# Patient Record
Sex: MALE | Race: WHITE | Employment: OTHER | URBAN - METROPOLITAN AREA
[De-identification: names, ages, dates, MRNs, and addresses within clinical notes are randomized per-mention and may not be internally consistent; named-entity substitution may affect disease eponyms.]

---

## 2017-01-05 DIAGNOSIS — E78.00 PURE HYPERCHOLESTEROLEMIA: ICD-10-CM

## 2017-01-05 RX ORDER — SIMVASTATIN 10 MG/1
TABLET, FILM COATED ORAL
Qty: 90 TAB | Refills: 1 | Status: SHIPPED | OUTPATIENT
Start: 2017-01-05 | End: 2017-07-17 | Stop reason: SDUPTHER

## 2017-02-03 ENCOUNTER — TELEPHONE (OUTPATIENT)
Dept: INTERNAL MEDICINE CLINIC | Age: 82
End: 2017-02-03

## 2017-02-06 NOTE — TELEPHONE ENCOUNTER
Writer spoke with patient and informed him that he received his prevnar 13 in 2015 at [facility], and that he also received pneumovax in 2001 here at the office.  Patient understood

## 2017-05-30 ENCOUNTER — OFFICE VISIT (OUTPATIENT)
Dept: INTERNAL MEDICINE CLINIC | Age: 82
End: 2017-05-30

## 2017-05-30 VITALS
SYSTOLIC BLOOD PRESSURE: 137 MMHG | DIASTOLIC BLOOD PRESSURE: 69 MMHG | OXYGEN SATURATION: 96 % | BODY MASS INDEX: 24.45 KG/M2 | HEART RATE: 72 BPM | RESPIRATION RATE: 18 BRPM | HEIGHT: 67 IN | WEIGHT: 155.8 LBS | TEMPERATURE: 98.3 F

## 2017-05-30 DIAGNOSIS — S40.022A TRAUMATIC HEMATOMA OF LEFT UPPER ARM, INITIAL ENCOUNTER: ICD-10-CM

## 2017-05-30 DIAGNOSIS — S40.812A ABRASION OF LEFT ARM, INITIAL ENCOUNTER: Primary | ICD-10-CM

## 2017-05-30 NOTE — PROGRESS NOTES
HISTORY OF PRESENT ILLNESS  Ashley Giang is a 80 y.o. male. HPI  Patient presents to the office for evaluation of his left arm. He reports last week he went to see his daughter and her family. When he stopped at the hotel, he tripped over the curb and fell into a brick wall. He used his left forearm to stop the fall. He reports soon after that he noticed a small like ball under the skin. He called his daughter and she told him he had a hematoma and to apply ice. Once he got to his daughter's home his son- in law looked at the area and used some coban dressing and a topical on the area. He reports it was looking much better until he hit again before leaving on the car trunk. He states it only hurt a little and he continued on his day. He noticed today on the drive home that he was having some swelling again and decided to come in for evaluation. Review of Systems   Musculoskeletal:        Left forearm swelling and  bruising, mild pain     Blood pressure 137/69, pulse 72, temperature 98.3 °F (36.8 °C), temperature source Oral, resp. rate 18, height 5' 7\" (1.702 m), weight 155 lb 12.8 oz (70.7 kg), SpO2 96 %. Physical Exam   Skin:   Left forearm- coban present on the arm. Appears tight  After removing the coban and two Band-Aid small abrasion noted. The area does not appear infected. He does have considerable ecchymosis. Edema is noted distal of both ends or were the coban was located. Very mild tenderness. Does not appear infected. ASSESSMENT and PLAN  Sis was seen today for arm swelling. Diagnoses and all orders for this visit:    Abrasion of left arm, initial encounter    Traumatic hematoma of left upper arm, initial encounter    I cleansed the area with saline after removing the dressing. Advised him the swelling will improve with elevation. I would hold with the coban at this time. Used non- adherent dressing on the abrasion. The bruising will resolve with time.  It may go through some color changes and this is normal. I Dr. Eleni Gaitan was able to see the area as well and agrees. He may follow up if needed.

## 2017-07-17 DIAGNOSIS — E78.00 PURE HYPERCHOLESTEROLEMIA: ICD-10-CM

## 2017-07-17 RX ORDER — SIMVASTATIN 10 MG/1
TABLET, FILM COATED ORAL
Qty: 90 TAB | Refills: 1 | Status: SHIPPED | OUTPATIENT
Start: 2017-07-17 | End: 2018-01-16 | Stop reason: SDUPTHER

## 2017-08-02 ENCOUNTER — TELEPHONE (OUTPATIENT)
Dept: INTERNAL MEDICINE CLINIC | Age: 82
End: 2017-08-02

## 2017-08-02 DIAGNOSIS — E78.00 PURE HYPERCHOLESTEROLEMIA: Primary | ICD-10-CM

## 2017-08-02 DIAGNOSIS — M81.0 OSTEOPOROSIS WITHOUT CURRENT PATHOLOGICAL FRACTURE, UNSPECIFIED OSTEOPOROSIS TYPE: ICD-10-CM

## 2017-08-02 DIAGNOSIS — I65.29 STENOSIS OF CAROTID ARTERY, UNSPECIFIED LATERALITY: ICD-10-CM

## 2017-08-02 NOTE — TELEPHONE ENCOUNTER
Called patient to [facility] CPE apt for Friday 9/15. The only CPE apts left for September are afternoon apts. The patient was wondering if he could  lab work before his new apt (9/14 @ 1:15) because he can't wait that long to eat in the day.  Please call him back at [phone] to let him know if he is able to do this

## 2017-08-03 DIAGNOSIS — F32.A ANXIETY AND DEPRESSION: ICD-10-CM

## 2017-08-03 DIAGNOSIS — F41.9 ANXIETY AND DEPRESSION: ICD-10-CM

## 2017-08-03 RX ORDER — ESCITALOPRAM OXALATE 10 MG/1
10 TABLET ORAL DAILY
Qty: 90 TAB | Refills: 0 | Status: SHIPPED | OUTPATIENT
Start: 2017-08-03 | End: 2017-10-13 | Stop reason: SDUPTHER

## 2017-08-03 NOTE — TELEPHONE ENCOUNTER
Notified the patient that his lab slip is available for  at the . He will complete the labs a few days prior to his appt.

## 2017-09-07 ENCOUNTER — TELEPHONE (OUTPATIENT)
Dept: INTERNAL MEDICINE CLINIC | Age: 82
End: 2017-09-07

## 2017-09-11 ENCOUNTER — HOSPITAL ENCOUNTER (OUTPATIENT)
Dept: LAB | Age: 82
Discharge: HOME | End: 2017-09-11
Payer: MEDICARE

## 2017-09-11 PROCEDURE — 80061 LIPID PANEL: CPT

## 2017-09-11 PROCEDURE — 82306 VITAMIN D 25 HYDROXY: CPT

## 2017-09-11 PROCEDURE — 36415 COLL VENOUS BLD VENIPUNCTURE: CPT

## 2017-09-11 PROCEDURE — 80053 COMPREHEN METABOLIC PANEL: CPT

## 2017-09-11 PROCEDURE — 85025 COMPLETE CBC W/AUTO DIFF WBC: CPT

## 2017-09-12 LAB
25(OH)D3+25(OH)D2 SERPL-MCNC: 46.6 NG/ML (ref 30–100)
ALBUMIN SERPL-MCNC: 4.2 G/DL (ref 3.5–4.7)
ALBUMIN/GLOB SERPL: 1.8 {RATIO} (ref 1.2–2.2)
ALP SERPL-CCNC: 49 IU/L (ref 39–117)
ALT SERPL-CCNC: 18 IU/L (ref 0–44)
AST SERPL-CCNC: 23 IU/L (ref 0–40)
BASOPHILS # BLD AUTO: 0 X10E3/UL (ref 0–0.2)
BASOPHILS NFR BLD AUTO: 1 %
BILIRUB SERPL-MCNC: 0.6 MG/DL (ref 0–1.2)
BUN SERPL-MCNC: 18 MG/DL (ref 8–27)
BUN/CREAT SERPL: 24 (ref 10–24)
CALCIUM SERPL-MCNC: 9.2 MG/DL (ref 8.6–10.2)
CHLORIDE SERPL-SCNC: 102 MMOL/L (ref 96–106)
CHOLEST SERPL-MCNC: 140 MG/DL (ref 100–199)
CO2 SERPL-SCNC: 24 MMOL/L (ref 18–29)
CREAT SERPL-MCNC: 0.75 MG/DL (ref 0.76–1.27)
EOSINOPHIL # BLD AUTO: 0 X10E3/UL (ref 0–0.4)
EOSINOPHIL NFR BLD AUTO: 1 %
ERYTHROCYTE [DISTWIDTH] IN BLOOD BY AUTOMATED COUNT: 14.7 % (ref 12.3–15.4)
GLOBULIN SER CALC-MCNC: 2.4 G/DL (ref 1.5–4.5)
GLUCOSE SERPL-MCNC: 101 MG/DL (ref 65–99)
HCT VFR BLD AUTO: 36.8 % (ref 37.5–51)
HDLC SERPL-MCNC: 57 MG/DL
HGB BLD-MCNC: 12.4 G/DL (ref 12.6–17.7)
IMM GRANULOCYTES # BLD: 0 X10E3/UL (ref 0–0.1)
IMM GRANULOCYTES NFR BLD: 0 %
INTERPRETATION, 910389: NORMAL
LDLC SERPL CALC-MCNC: 72 MG/DL (ref 0–99)
LYMPHOCYTES # BLD AUTO: 1.4 X10E3/UL (ref 0.7–3.1)
LYMPHOCYTES NFR BLD AUTO: 36 %
MCH RBC QN AUTO: 33.5 PG (ref 26.6–33)
MCHC RBC AUTO-ENTMCNC: 33.7 G/DL (ref 31.5–35.7)
MCV RBC AUTO: 100 FL (ref 79–97)
MONOCYTES # BLD AUTO: 0.6 X10E3/UL (ref 0.1–0.9)
MONOCYTES NFR BLD AUTO: 14 %
NEUTROPHILS # BLD AUTO: 1.9 X10E3/UL (ref 1.4–7)
NEUTROPHILS NFR BLD AUTO: 48 %
PLATELET # BLD AUTO: 209 X10E3/UL (ref 150–379)
POTASSIUM SERPL-SCNC: 4.6 MMOL/L (ref 3.5–5.2)
PROT SERPL-MCNC: 6.6 G/DL (ref 6–8.5)
RBC # BLD AUTO: 3.7 X10E6/UL (ref 4.14–5.8)
SODIUM SERPL-SCNC: 141 MMOL/L (ref 134–144)
TRIGL SERPL-MCNC: 55 MG/DL (ref 0–149)
VLDLC SERPL CALC-MCNC: 11 MG/DL (ref 5–40)
WBC # BLD AUTO: 3.9 X10E3/UL (ref 3.4–10.8)

## 2017-09-14 ENCOUNTER — OFFICE VISIT (OUTPATIENT)
Dept: INTERNAL MEDICINE CLINIC | Age: 82
End: 2017-09-14

## 2017-09-14 VITALS
OXYGEN SATURATION: 98 % | WEIGHT: 156 LBS | HEIGHT: 67 IN | RESPIRATION RATE: 16 BRPM | HEART RATE: 68 BPM | BODY MASS INDEX: 24.48 KG/M2 | SYSTOLIC BLOOD PRESSURE: 112 MMHG | DIASTOLIC BLOOD PRESSURE: 66 MMHG

## 2017-09-14 DIAGNOSIS — M81.0 OSTEOPOROSIS WITHOUT CURRENT PATHOLOGICAL FRACTURE, UNSPECIFIED OSTEOPOROSIS TYPE: ICD-10-CM

## 2017-09-14 DIAGNOSIS — D75.89 MACROCYTOSIS: ICD-10-CM

## 2017-09-14 DIAGNOSIS — K66.8 MESENTERIC CYST: ICD-10-CM

## 2017-09-14 DIAGNOSIS — Z79.899 HIGH RISK MEDICATION USE: ICD-10-CM

## 2017-09-14 DIAGNOSIS — D64.9 ANEMIA, UNSPECIFIED TYPE: ICD-10-CM

## 2017-09-14 DIAGNOSIS — Z00.00 MEDICARE ANNUAL WELLNESS VISIT, SUBSEQUENT: Primary | ICD-10-CM

## 2017-09-14 RX ORDER — FLUORIDE (SODIUM) 0.02 %
SOLUTION, NON-ORAL DENTAL
COMMUNITY
Start: 2017-08-20

## 2017-09-14 RX ORDER — CICLOPIROX 80 MG/ML
SOLUTION TOPICAL
COMMUNITY
Start: 2017-07-16

## 2017-09-14 NOTE — PATIENT INSTRUCTIONS

## 2017-09-14 NOTE — PROGRESS NOTES
Coordination of Care Questions    1. Have you been to the ER, urgent care clinic since your last visit? No       Hospitalized since your last visit? No    2. Have you seen or consulted any other health care providers outside of the [address] [place] [address] since your last visit? Include any pap smears or colon screening.  Yes, [employer]

## 2017-09-14 NOTE — PROGRESS NOTES
Chief Complaint   Patient presents with    Annual Wellness Visit     discuss labs      numbess  bilat hands  abd sxwelling    Patient Active Problem List    Diagnosis    PVC (premature ventricular contraction)     10/14 holter, rare pvc, pac, no sig elis or tachy  10/14 echo calcified non stenotic av, otherwise normal      Dizzy    Mesenteric cyst    SUSAN on CPAP    Insomnia    Allergic rhinitis, cause unspecified    Pure hypercholesterolemia    Carotid stenosis     Mild disease  2012 repeat 2014 no change        Osteoporosis     Nestor Guido  On meds for 10 years        Drug Holiday      Neg 2.7  Lumbar osteoporosis      Colon polyp    Diverticulosis    OCD (obsessive compulsive disorder)       Subjective:   He feels generally well, some underlying slight anxiety. Bowel function has been good. His fingertips get numb, mostly when he wakes up in the morning, it is mostly the 4th and 5th. Doesn't seem to bother him during the day. He's had no trauma to his elbows, no trauma to his hands. ROM of his neck sometimes creaks and probably has arthritis. Bowel function has been good. Shortness of breath or chest pain are denied. Followed with a number of specialities. Mood has been pretty stable with present meds. Appetite is good. He continues to do all of his own ADLs and continues to be very active. 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 He reads a lot and is quite interested in lots of topics. Physical Examination:  BP was normal.  Lungs were clear. S1, S2 regular. He has an abdominal mass that is an old mesenteric cyst.  He thinks it's enlarging. Plan:  1. His lab work shows a macrocytic anemia. The anemia is a little lower than before and he does have macrocytosis. Last year B12 levels were normal.  Lab work otherwise is pretty unrevealing.   So my recommendations would be in a couple of months repeat his CBC, chemistry, retic count and other studies to see if this is a worsening issue. 2. He will have a CT of the abdomen because of the abdominal mass, which previously was a mesenteric cyst, to look and see if it's changing in size. 3. His medications will be renewed, no changes will be made effective today. We discussed this in detail. 4. In reference to the hand tingling, nothing on exam and he has good strength. I suspect it is either carpal tunnel, atypical, or some ulnar neuropathy, intermittent, and will monitor. Offered him wrist splints at night. He was reluctant to try those. This is a Subsequent Medicare Annual Wellness Exam (AWV) (Performed 12 months after IPPE or effective date of Medicare Part B enrollment, Once in a lifetime)    I have reviewed the patient's medical history in detail and updated the computerized patient record. History     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    Muscle pain     OCD (obsessive compulsive disorder)     SUSAN on CPAP 8/8/2013    Osteoporosis     Urinary frequency       Past Surgical History:   Procedure Laterality Date    ENDOSCOPY, COLON, DIAGNOSTIC      12/09; [facility]    HX COLONOSCOPY  2008    HX ORTHOPAEDIC      at 6years old broke arm    HX TONSIL AND ADENOIDECTOMY      about 9years old     Current Outpatient Prescriptions   Medication Sig Dispense Refill    ciclopirox (PENLAC) 8 % solution       PREVIDENT 0.2 % soln       escitalopram oxalate (LEXAPRO) 10 mg tablet Take 1 Tab by mouth daily. 90 Tab 0    simvastatin (ZOCOR) 10 mg tablet TAKE ONE TABLET BY MOUTH EVERY EVENING 90 Tab 1    latanoprost (XALATAN) 0.005 % ophthalmic solution Administer 1 Drop to left eye nightly.  fluticasone (FLONASE) 50 mcg/actuation nasal spray 2 Sprays by Both Nostrils route daily.  (Patient taking differently: 2 Sprays by Both Nostrils route daily as needed.) 1 Bottle 5    alendronate (FOSAMAX) 70 mg tablet Take  by mouth.  aspirin delayed-release 81 mg tablet Take 81 mg by mouth daily.  COCONUT OIL PO Take  by mouth.  MULTIVITAMIN PO Take  by mouth.  ergocalciferol (VITAMIN D) 50,000 unit capsule Take 50,000 Units by mouth. Vitamin D every 10 days.  omega-3 fatty acids-vitamin e (FISH OIL) 1,000 mg Cap Take 1 Cap by mouth two (2) times a day.  cpap machine kit by Does Not Apply route.  CALCIUM CARBONATE/VITAMIN D3 (CALCIUM + D PO) Take  by mouth. Allergies   Allergen Reactions    Sulfa (Sulfonamide Antibiotics) Unknown (comments)     Family History   Problem Relation Age of Onset    Heart Disease Father      heart attack age 63    Psychiatric Disorder Mother     Diabetes Daughter      Social History   Substance Use Topics    Smoking status: Former Smoker     Quit date: 3/26/1972    Smokeless tobacco: Never Used    Alcohol use Yes      Comment: <1     Patient Active Problem List   Diagnosis Code    Osteoporosis M81.0    Colon polyp K63.5    Diverticulosis K57.90    OCD (obsessive compulsive disorder) F42.9    Allergic rhinitis, cause unspecified J30.9    Pure hypercholesterolemia E78.00    Carotid stenosis I65.29    Insomnia G47.00    SUSAN on CPAP G47.33, Z99.89    Mesenteric cyst K66.8    PVC (premature ventricular contraction) I49.3    Dizzy R42    Macrocytosis D75.89       Depression Risk Factor Screening:     PHQ over the last two weeks 9/14/2017   PHQ Not Done -   Little interest or pleasure in doing things Not at all   Feeling down, depressed or hopeless Not at all   Total Score PHQ 2 0     Alcohol Risk Factor Screening: You do not drink alcohol or very rarely. Functional Ability and Level of Safety:   Hearing LossHearing is good.     Activities of Daily Living  The home contains: no safety equipment  Patient does total self care    Fall Risk  Fall Risk Assessment, last 12 mths 9/14/2017   Able to walk? Yes   Fall in past 12 months? No   Fall with injury? -   Number of falls in past 12 months -   Fall Risk Score -       Abuse Screen  Patient is not abused    Cognitive Screening   Evaluation of Cognitive Function:  Has your family/caregiver stated any concerns about your memory: no  Normal    Patient Care Team   Patient Care Team:  Maritza Dean MD as PCP - General (Internal Medicine)  Fransisco Pablo MD (Cardiology)    Assessment/Plan   Education and counseling provided:  Are appropriate based on today's review and evaluation  End-of-Life planning (with patient's consent)  Pneumococcal Vaccine        Health Maintenance Due   Topic Date Due    DTaP/Tdap/Td series (1 - Tdap) 03/07/1953    COLONOSCOPY  12/01/2014    GLAUCOMA SCREENING Q2Y  12/01/2015    INFLUENZA AGE 9 TO ADULT  08/01/2017    MEDICARE YEARLY EXAM  09/09/2017     Advance Care Planning (ACP) Provider Conversation Snapshot    Date of ACP Conversation: 09/14/17  Persons included in Conversation:  patient  Length of ACP Conversation in minutes:  <16 minutes (Non-Billable)    Authorized Decision Maker (if patient is incapable of making informed decisions): This person is:   patient          For Patients with Decision Making Capacity:   Values/Goals: Exploration of values, goals, and preferences if recovery is not expected, even with continued medical treatment in the event of:  Imminent death    Conversation Outcomes / Follow-Up Plan:   Recommended communicating the plan and making copies for the healthcare agent, personal physician, and others as appropriate (e.g., health system)  Recommended review of completed ACP document annually or upon change in health status      1.  Medicare annual wellness visit, subsequent    - ciclopirox (PENLAC) 8 % solution;   - PREVIDENT 0.2 % soln;   - CBC WITH AUTOMATED DIFF  - PROTEIN ELECTROPHORESIS  - HAPTOGLOBIN  - IRON PROFILE  - RETICULOCYTE COUNT    2. Macrocytosis  Reviewed plan  - ciclopirox (PENLAC) 8 % solution;   - PREVIDENT 0.2 % soln;   - CBC WITH AUTOMATED DIFF  - PROTEIN ELECTROPHORESIS  - HAPTOGLOBIN  - IRON PROFILE  - RETICULOCYTE COUNT    3. Anemia, unspecified type    - CBC WITH AUTOMATED DIFF  - PROTEIN ELECTROPHORESIS  - HAPTOGLOBIN  - IRON PROFILE  - RETICULOCYTE COUNT    4. Mesenteric cyst  larger  - CT ABD PELV WO CONT; Future    5. Osteoporosis without current pathological fracture, unspecified osteoporosis type  Seeing endo on alendronate note reviewed    6.  High risk medication use  reviewed

## 2017-09-18 DIAGNOSIS — K66.8 MESENTERIC CYST: Primary | ICD-10-CM

## 2017-10-13 ENCOUNTER — PATIENT MESSAGE (OUTPATIENT)
Dept: INTERNAL MEDICINE CLINIC | Age: 82
End: 2017-10-13

## 2017-10-13 DIAGNOSIS — F41.9 ANXIETY AND DEPRESSION: ICD-10-CM

## 2017-10-13 DIAGNOSIS — F32.A ANXIETY AND DEPRESSION: ICD-10-CM

## 2017-10-13 RX ORDER — ESCITALOPRAM OXALATE 10 MG/1
10 TABLET ORAL DAILY
Qty: 90 TAB | Refills: 1 | Status: SHIPPED | OUTPATIENT
Start: 2017-10-13 | End: 2018-04-22 | Stop reason: SDUPTHER

## 2017-10-13 NOTE — TELEPHONE ENCOUNTER
From: Merline Cruel  To: Diego Montague MD  Sent: 10/13/2017 12:15 PM EDT  Subject: Prescription Question    Dr. Poncho Wright,    I would appreciate your sending to [facility] a new prescription for Escitalopram Tabs 10mg with refills. I am curently out of refills. Thanks.     Sis

## 2017-10-20 ENCOUNTER — HOSPITAL ENCOUNTER (OUTPATIENT)
Dept: ULTRASOUND IMAGING | Age: 82
Discharge: HOME | End: 2017-10-20
Attending: INTERNAL MEDICINE
Payer: MEDICARE

## 2017-10-20 DIAGNOSIS — K66.8 MESENTERIC CYST: ICD-10-CM

## 2017-10-20 PROCEDURE — 76700 US EXAM ABDOM COMPLETE: CPT

## 2017-12-05 ENCOUNTER — OFFICE VISIT (OUTPATIENT)
Dept: INTERNAL MEDICINE CLINIC | Age: 82
End: 2017-12-05

## 2017-12-05 VITALS
DIASTOLIC BLOOD PRESSURE: 51 MMHG | HEIGHT: 67 IN | HEART RATE: 73 BPM | SYSTOLIC BLOOD PRESSURE: 115 MMHG | WEIGHT: 158 LBS | TEMPERATURE: 98.2 F | RESPIRATION RATE: 16 BRPM | OXYGEN SATURATION: 94 % | BODY MASS INDEX: 24.8 KG/M2

## 2017-12-05 DIAGNOSIS — M72.2 PLANTAR FASCIITIS: ICD-10-CM

## 2017-12-05 DIAGNOSIS — R31.0 GROSS HEMATURIA: Primary | ICD-10-CM

## 2017-12-05 DIAGNOSIS — R31.29 OTHER MICROSCOPIC HEMATURIA: ICD-10-CM

## 2017-12-05 LAB
BILIRUB UR QL STRIP: NEGATIVE
GLUCOSE UR-MCNC: NEGATIVE MG/DL
KETONES P FAST UR STRIP-MCNC: NORMAL MG/DL
PH UR STRIP: 6 [PH] (ref 4.6–8)
PROT UR QL STRIP: NORMAL
SP GR UR STRIP: 1.02 (ref 1–1.03)
UA UROBILINOGEN AMB POC: NORMAL (ref 0.2–1)
URINALYSIS CLARITY POC: CLEAR
URINALYSIS COLOR POC: YELLOW
URINE BLOOD POC: NEGATIVE
URINE LEUKOCYTES POC: NEGATIVE
URINE NITRITES POC: NEGATIVE

## 2017-12-05 RX ORDER — MULTIVITAMIN
CAPSULE ORAL
COMMUNITY
Start: 2016-11-07 | End: 2016-11-07

## 2017-12-05 NOTE — PROGRESS NOTES
All health maintenance and other pertinent information has been reviewed in preparation for today's office visit. Patient presents in the office today for:    Chief Complaint   Patient presents with    Blood in Urine     Pt c/o small amount of blood in urine which occured 10/1/17. Has not occured since; pt denies any other symptoms of complications. 1. Have you been to the ER, urgent care clinic since your last visit? Hospitalized since your last visit? No    2. Have you seen or consulted any other health care providers outside of the [address] [place] [address] since your last visit? Include any pap smears or colon screening.  No

## 2017-12-05 NOTE — MR AVS SNAPSHOT
Visit Information Date & Time Provider Department Dept. Phone Encounter #  
 12/5/2017  8:30 AM Pillo Ortiz MD Replaced by [facility] [phone] [id number] Upcoming Health Maintenance Date Due DTaP/Tdap/Td series (1 - Tdap) 3/7/1953 COLONOSCOPY 12/1/2014 GLAUCOMA SCREENING Q2Y 12/1/2015 Influenza Age 5 to Adult 8/1/2017 MEDICARE YEARLY EXAM 9/15/2018 Allergies as of 12/5/2017  Review Complete On: 12/5/2017 By: Gabbi Lew LPN Severity Noted Reaction Type Reactions Sulfa (Sulfonamide Antibiotics)  09/12/2011    Unknown (comments) Current Immunizations  Reviewed on 2/6/2017 Name Date Influenza High Dose Vaccine PF 10/1/2016 Influenza Vaccine 9/30/2013 Pneumococcal Conjugate (PCV-13) 10/6/2015 Pneumococcal Vaccine (Unspecified Type) 7/8/2001 Zoster Vaccine, Live 1/2/2009 Not reviewed this visit You Were Diagnosed With   
  
 Codes Comments Gross hematuria    -  Primary ICD-10-CM: R31.0 ICD-9-CM: 599.71 Other microscopic hematuria     ICD-10-CM: R31.29 ICD-9-CM: 599.72 Vitals BP Pulse Temp Resp Height(growth percentile) Weight(growth percentile) 115/51 (BP 1 Location: Left arm, BP Patient Position: Sitting) 73 98.2 °F (36.8 °C) (Oral) 16 5' 7\" (1.702 m) 158 lb (71.7 kg) SpO2 BMI Smoking Status 94% 24.75 kg/m2 Former Smoker Vitals History BMI and BSA Data Body Mass Index Body Surface Area 24.75 kg/m 2 1.84 m 2 Preferred Pharmacy Pharmacy Name Phone [phone] [address] [phone] Your Updated Medication List  
  
   
This list is accurate as of: 12/5/17  9:08 AM.  Always use your most recent med list.  
  
  
  
  
 aspirin delayed-release 81 mg tablet Take 81 mg by mouth daily. CALCIUM + D PO Take  by mouth.  
  
 ciclopirox 8 % solution Commonly known as:  PENLAC  
  
 COCONUT OIL PO  
 Take  by mouth. cpap machine kit  
by Does Not Apply route. escitalopram oxalate 10 mg tablet Commonly known as:  Annelise Moots Take 1 Tab by mouth daily. FISH OIL 1,000 mg Cap Generic drug:  omega-3 fatty acids-vitamin e Take 1 Cap by mouth two (2) times a day. fluticasone 50 mcg/actuation nasal spray Commonly known as:  Leah Bliss 2 Sprays by Both Nostrils route daily. FOSAMAX 70 mg tablet Generic drug:  alendronate Take  by mouth.  
  
 latanoprost 0.005 % ophthalmic solution Commonly known as:  [place] Administer 1 Drop to left eye nightly. MULTIVITAMIN PO Take  by mouth. PREVIDENT 0.2 % Soln Generic drug:  sodium fluoride (dental rinse)  
  
 simvastatin 10 mg tablet Commonly known as:  ZOCOR  
TAKE ONE TABLET BY MOUTH EVERY EVENING  
  
 VITAMIN D2 50,000 unit capsule Generic drug:  ergocalciferol Take 50,000 Units by mouth. Vitamin D every 10 days. We Performed the Following AMB POC URINALYSIS DIP STICK AUTO W/O MICRO [93072 CPT(R)] REFERRAL TO UROLOGY [JXF880 Custom] Referral Information Referral ID Referred By Referred To  
  
 [id number] Vicky Arizmendi MD   
   [address] Phone: [phone] Fax: [phone] Visits Status Start Date End Date 1 New Request 12/5/17 12/5/18 If your referral has a status of pending review or denied, additional information will be sent to support the outcome of this decision. Patient Instructions Plantar Fasciitis: Exercises Your Care Instructions Here are some examples of typical rehabilitation exercises for your condition. Start each exercise slowly. Ease off the exercise if you start to have pain. Your doctor or physical therapist will tell you when you can start these exercises and which ones will work best for you. How to do the exercises Towel stretch 1. Sit with your legs extended and knees straight. 2. Place a towel around your foot just under the toes. 3. Hold each end of the towel in each hand, with your hands above your knees. 4. Pull back with the towel so that your foot stretches toward you. 5. Hold the position for at least 15 to 30 seconds. 6. Repeat 2 to 4 times a session, up to 5 sessions a day. Calf stretch This exercise stretches the muscles at the back of the lower leg (the calf) and the Achilles tendon. Do this exercise 3 or 4 times a day, 5 days a week. 1. Stand facing a wall with your hands on the wall at about eye level. Put the leg you want to stretch about a step behind your other leg. 2. Keeping your back heel on the floor, bend your front knee until you feel a stretch in the back leg. 3. Hold the stretch for 15 to 30 seconds. Repeat 2 to 4 times. Plantar fascia and calf stretch Stretching the plantar fascia and calf muscles can increase flexibility and decrease heel pain. You can do this exercise several times each day and before and after activity. 1. Stand on a step as shown above. Be sure to hold on to the banister. 2. Slowly let your heels down over the edge of the step as you relax your calf muscles. You should feel a gentle stretch across the bottom of your foot and up the back of your leg to your knee. 3. Hold the stretch about 15 to 30 seconds, and then tighten your calf muscle a little to bring your heel back up to the level of the step. Repeat 2 to 4 times. Towel curls Make this exercise more challenging by placing a weighted object, such as a soup can, on the other end of the towel. 1. While sitting, place your foot on a towel on the floor and scrunch the towel toward you with your toes. 2. Then, also using your toes, push the towel away from you. [place] pickups 1. Put marbles on the floor next to a cup. 
2. Using your toes, try to lift the marbles up from the floor and put them in the cup. Follow-up care is a key part of your treatment and safety. Be sure to make and go to all appointments, and call your doctor if you are having problems. It's also a good idea to know your test results and keep a list of the medicines you take. Where can you learn more? Go to http://sherwin-caprice.info/. Emilee Or in the search box to learn more about \"Plantar Fasciitis: Exercises. \" Current as of: March 21, 2017 Content Version: 11.4 © 2311-8725 V[employer]maker. Care instructions adapted under license by [employer] (which disclaims liability or warranty for this information). If you have questions about a medical condition or this instruction, always ask your healthcare professional. [address] any warranty or liability for your use of this information. Introducing [facility]! Dear Mikki Rubio: Thank you for requesting a [employer] Genius account. Our records indicate that you already have an active [employer] Genius account. You can access your account anytime at [email or web address] Did you know that you can access your hospital and ER discharge instructions at any time in DIY Genius? You can also review all of your test results from your hospital stay or ER visit. Additional Information If you have questions, please visit the Frequently Asked Questions section of the DIY Genius website at [email or web address] Remember, DIY Genius is NOT to be used for urgent needs. For medical emergencies, dial 911. Now available from your iPhone and Android! Please provide this summary of care documentation to your next provider. Your primary care clinician is listed as Tabitha Fu. If you have any questions after today's visit, please call [phone].

## 2017-12-05 NOTE — PATIENT INSTRUCTIONS
Plantar Fasciitis: Exercises  Your Care Instructions  Here are some examples of typical rehabilitation exercises for your condition. Start each exercise slowly. Ease off the exercise if you start to have pain. Your doctor or physical therapist will tell you when you can start these exercises and which ones will work best for you. How to do the exercises  Towel stretch    1. Sit with your legs extended and knees straight. 2. Place a towel around your foot just under the toes. 3. Hold each end of the towel in each hand, with your hands above your knees. 4. Pull back with the towel so that your foot stretches toward you. 5. Hold the position for at least 15 to 30 seconds. 6. Repeat 2 to 4 times a session, up to 5 sessions a day. Calf stretch    This exercise stretches the muscles at the back of the lower leg (the calf) and the Achilles tendon. Do this exercise 3 or 4 times a day, 5 days a week. 1. Stand facing a wall with your hands on the wall at about eye level. Put the leg you want to stretch about a step behind your other leg. 2. Keeping your back heel on the floor, bend your front knee until you feel a stretch in the back leg. 3. Hold the stretch for 15 to 30 seconds. Repeat 2 to 4 times. Plantar fascia and calf stretch    Stretching the plantar fascia and calf muscles can increase flexibility and decrease heel pain. You can do this exercise several times each day and before and after activity. 1. Stand on a step as shown above. Be sure to hold on to the banister. 2. Slowly let your heels down over the edge of the step as you relax your calf muscles. You should feel a gentle stretch across the bottom of your foot and up the back of your leg to your knee. 3. Hold the stretch about 15 to 30 seconds, and then tighten your calf muscle a little to bring your heel back up to the level of the step. Repeat 2 to 4 times.   Towel curls    Make this exercise more challenging by placing a weighted object, such as a soup can, on the other end of the towel. 1. While sitting, place your foot on a towel on the floor and scrunch the towel toward you with your toes. 2. Then, also using your toes, push the towel away from you. [place] pickups    1. Put marbles on the floor next to a cup.  2. Using your toes, try to lift the marbles up from the floor and put them in the cup. Follow-up care is a key part of your treatment and safety. Be sure to make and go to all appointments, and call your doctor if you are having problems. It's also a good idea to know your test results and keep a list of the medicines you take. Where can you learn more? Go to http://sherwin-caprice.info/. Griselda Riggins in the search box to learn more about \"Plantar Fasciitis: Exercises. \"  Current as of: March 21, 2017  Content Version: 11.4  © 0434-4176 Healthwise, [employer]orporated. Care instructions adapted under license by [employer] (which disclaims liability or warranty for this information). If you have questions about a medical condition or this instruction, always ask your healthcare professional. [address] [address] any warranty or liability for your use of this information.

## 2017-12-05 NOTE — PROGRESS NOTES
Chief Complaint   Patient presents with    Blood in Urine     Pt c/o small amount of blood in urine which occured 10/1/17. Has not occured since; pt denies any other symptoms of complications. Frequency slow stream are normal  For him the urine has cleared it was painless bleeding  Had US in October both kidneys appeared normal  Prostate exam in sept un revealing  US Results (most recent):    [facility] from [facility] [place] [place] on 10[facility]20/17   US ABD COMP   Narrative EXAM:  US ABD COMP     INDICATION: Follow-up mesenteric cysts. COMPARISON: 9/25/2015. TECHNIQUE:   Real-time sonography of the abdomen was performed with multiple static images of  the liver, gallbladder, pancreas, spleen, kidneys and retroperitoneum obtained. FINDINGS:  LIVER:   The liver is normal in echotexture with 2 stable hemangiomata, the larger of  which measures 2.9 cm. LIVER VASCULATURE:   The portal vein flow is hepatopedal.    GALLBLADDER:  Sludge is noted in the gallbladder. No gallbladder wall thickening or  sonographic Thrasher's sign. COMMON BILE DUCT:  There is no biliary duct dilatation and the common duct measures 3.4 mm in  diameter. PANCREAS:  The pancreas is not well-visualized due to bowel gas. SPLEEN:  The spleen is normal in echotexture and size and measures 10.4 cm in length. RIGHT KIDNEY:  The right kidney demonstrates normal echogenicity with no mass, stone or  hydronephrosis. The right kidney measures 11.1 cm in length. LEFT KIDNEY:  The left kidney demonstrates normal echogenicity with no mass, stone or  hydronephrosis. The left kidney measures 11.1 cm in length. RETROPERITONEUM:  The aorta is not well-visualized due to bowel gas  The IVC is normal.  No retroperitoneal mass is identified. The left lower quadrant cyst is unchanged in size, measuring 14.6 x 10.3 x 14.9  cm. Impression IMPRESSION: Large cystic lesion in the left lower quadrant is stable.  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 Sludge is noted in the gallbladder. Vitals:    12/05/17 0830   BP: 115/51   Pulse: 73   Resp: 16   Temp: 98.2 °F (36.8 °C)   TempSrc: Oral   SpO2: 94%   Weight: 158 lb (71.7 kg)   Height: 5' 7\" (1.702 m)     no apparent distress    The abdomen is soft without tenderness, guarding, mass, rebound or organomegaly. Bowel sounds are normal. No CVA tenderness or inguinal adenopathy noted. has fullness from cyst    Results for orders placed or performed in visit on 12/05/17   AMB POC URINALYSIS DIP STICK AUTO W/O MICRO   Result Value Ref Range    Color (UA POC) Yellow     Clarity (UA POC) Clear     Glucose (UA POC) Negative Negative    Bilirubin (UA POC) Negative Negative    Ketones (UA POC) Trace Negative    Specific gravity (UA POC) 1.020 1.001 - 1.035    Blood (UA POC) Negative Negative    pH (UA POC) 6.0 4.6 - 8.0    Protein (UA POC) 1+ Negative    Urobilinogen (UA POC) 0.2 mg/dL 0.2 - 1    Nitrites (UA POC) Negative Negative    Leukocyte esterase (UA POC) Negative Negative     He wishes to also address some pain in the heel on left at today's visit. These have been mild-to-moderate in nature, gradual in onset. Exam shows mild generalized muscle tenderness. These pains seem benign and are likely related to fasciitis. OTC or prescription NSAID's are recommended for PRN use, side effects are discussed. Return for further discussion if these persist or worsen.           1. Other microscopic hematuria  clear  - AMB POC URINALYSIS DIP STICK AUTO W/O MICRO    2. Gross hematuria  Needs work up reviewed in detail stop aspirin now no nsaids  [facility]    3. Plantar fasciitis  stretchinfg

## 2018-01-16 DIAGNOSIS — E78.00 PURE HYPERCHOLESTEROLEMIA: ICD-10-CM

## 2018-01-16 RX ORDER — SIMVASTATIN 10 MG/1
TABLET, FILM COATED ORAL
Qty: 90 TAB | Refills: 1 | Status: SHIPPED | OUTPATIENT
Start: 2018-01-16 | End: 2018-08-16 | Stop reason: SDUPTHER

## 2018-01-16 NOTE — TELEPHONE ENCOUNTER
Patient would like Rx for \"symbastatin\" with refills sent to [facility].  Please call patient back at [phone] if there are questions.              From answering service

## 2018-03-03 ENCOUNTER — APPOINTMENT (OUTPATIENT)
Dept: GENERAL RADIOLOGY | Age: 83
End: 2018-03-03
Attending: EMERGENCY MEDICINE
Payer: MEDICARE

## 2018-03-03 ENCOUNTER — HOSPITAL ENCOUNTER (EMERGENCY)
Age: 83
Discharge: HOME | End: 2018-03-03
Attending: EMERGENCY MEDICINE
Payer: MEDICARE

## 2018-03-03 VITALS
HEART RATE: 95 BPM | TEMPERATURE: 98.6 F | OXYGEN SATURATION: 100 % | RESPIRATION RATE: 16 BRPM | DIASTOLIC BLOOD PRESSURE: 68 MMHG | BODY MASS INDEX: 24.8 KG/M2 | WEIGHT: 158 LBS | HEIGHT: 67 IN | SYSTOLIC BLOOD PRESSURE: 110 MMHG

## 2018-03-03 DIAGNOSIS — R33.9 URINARY RETENTION: Primary | ICD-10-CM

## 2018-03-03 LAB
ALBUMIN SERPL-MCNC: 3.6 G/DL (ref 3.5–5)
ALBUMIN/GLOB SERPL: 1 {RATIO} (ref 1.1–2.2)
ALP SERPL-CCNC: 62 U/L (ref 45–117)
ALT SERPL-CCNC: 20 U/L (ref 12–78)
ANION GAP SERPL CALC-SCNC: 8 MMOL/L (ref 5–15)
APPEARANCE UR: CLEAR
AST SERPL-CCNC: 22 U/L (ref 15–37)
BACTERIA URNS QL MICRO: ABNORMAL /HPF
BASOPHILS # BLD: 0 K/UL (ref 0–0.1)
BASOPHILS NFR BLD: 0 % (ref 0–1)
BILIRUB SERPL-MCNC: 0.8 MG/DL (ref 0.2–1)
BILIRUB UR QL: NEGATIVE
BUN SERPL-MCNC: 16 MG/DL (ref 6–20)
BUN/CREAT SERPL: 22 (ref 12–20)
CALCIUM SERPL-MCNC: 8.4 MG/DL (ref 8.5–10.1)
CHLORIDE SERPL-SCNC: 106 MMOL/L (ref 97–108)
CO2 SERPL-SCNC: 24 MMOL/L (ref 21–32)
COLOR UR: ABNORMAL
CREAT SERPL-MCNC: 0.73 MG/DL (ref 0.7–1.3)
DIFFERENTIAL METHOD BLD: ABNORMAL
EOSINOPHIL # BLD: 0 K/UL (ref 0–0.4)
EOSINOPHIL NFR BLD: 0 % (ref 0–7)
EPITH CASTS URNS QL MICRO: ABNORMAL /LPF
ERYTHROCYTE [DISTWIDTH] IN BLOOD BY AUTOMATED COUNT: 14.3 % (ref 11.5–14.5)
GLOBULIN SER CALC-MCNC: 3.6 G/DL (ref 2–4)
GLUCOSE SERPL-MCNC: 113 MG/DL (ref 65–100)
GLUCOSE UR STRIP.AUTO-MCNC: 100 MG/DL
HCT VFR BLD AUTO: 36 % (ref 36.6–50.3)
HGB BLD-MCNC: 12.2 G/DL (ref 12.1–17)
HGB UR QL STRIP: ABNORMAL
HYALINE CASTS URNS QL MICRO: ABNORMAL /LPF (ref 0–5)
IMM GRANULOCYTES # BLD: 0.1 K/UL (ref 0–0.04)
IMM GRANULOCYTES NFR BLD AUTO: 1 % (ref 0–0.5)
KETONES UR QL STRIP.AUTO: NEGATIVE MG/DL
LEUKOCYTE ESTERASE UR QL STRIP.AUTO: ABNORMAL
LYMPHOCYTES # BLD: 1.8 K/UL (ref 0.8–3.5)
LYMPHOCYTES NFR BLD: 13 % (ref 12–49)
MCH RBC QN AUTO: 34.6 PG (ref 26–34)
MCHC RBC AUTO-ENTMCNC: 33.9 G/DL (ref 30–36.5)
MCV RBC AUTO: 102 FL (ref 80–99)
MONOCYTES # BLD: 1.6 K/UL (ref 0–1)
MONOCYTES NFR BLD: 12 % (ref 5–13)
NEUTS SEG # BLD: 10.5 K/UL (ref 1.8–8)
NEUTS SEG NFR BLD: 75 % (ref 32–75)
NITRITE UR QL STRIP.AUTO: NEGATIVE
NRBC # BLD: 0 K/UL (ref 0–0.01)
NRBC BLD-RTO: 0 PER 100 WBC
PH UR STRIP: 6.5 [PH] (ref 5–8)
PLATELET # BLD AUTO: 218 K/UL (ref 150–400)
PMV BLD AUTO: 9.6 FL (ref 8.9–12.9)
POTASSIUM SERPL-SCNC: 4.1 MMOL/L (ref 3.5–5.1)
PROT SERPL-MCNC: 7.2 G/DL (ref 6.4–8.2)
PROT UR STRIP-MCNC: NEGATIVE MG/DL
RBC # BLD AUTO: 3.53 M/UL (ref 4.1–5.7)
RBC #/AREA URNS HPF: ABNORMAL /HPF (ref 0–5)
SODIUM SERPL-SCNC: 138 MMOL/L (ref 136–145)
SP GR UR REFRACTOMETRY: 1.02 (ref 1–1.03)
UR CULT HOLD, URHOLD: NORMAL
UROBILINOGEN UR QL STRIP.AUTO: 0.2 EU/DL (ref 0.2–1)
WBC # BLD AUTO: 14 K/UL (ref 4.1–11.1)
WBC URNS QL MICRO: ABNORMAL /HPF (ref 0–4)

## 2018-03-03 PROCEDURE — 85025 COMPLETE CBC W/AUTO DIFF WBC: CPT | Performed by: EMERGENCY MEDICINE

## 2018-03-03 PROCEDURE — 77030005530 HC CATH URETH FOL40 BARD -B

## 2018-03-03 PROCEDURE — 77030005514 HC CATH URETH FOL14 BARD -A

## 2018-03-03 PROCEDURE — 36415 COLL VENOUS BLD VENIPUNCTURE: CPT | Performed by: EMERGENCY MEDICINE

## 2018-03-03 PROCEDURE — 77030029179 HC BAG URIN DRNG SIMS -A

## 2018-03-03 PROCEDURE — 51798 US URINE CAPACITY MEASURE: CPT

## 2018-03-03 PROCEDURE — 51702 INSERT TEMP BLADDER CATH: CPT

## 2018-03-03 PROCEDURE — 71046 X-RAY EXAM CHEST 2 VIEWS: CPT

## 2018-03-03 PROCEDURE — 81001 URINALYSIS AUTO W/SCOPE: CPT | Performed by: EMERGENCY MEDICINE

## 2018-03-03 PROCEDURE — 99283 EMERGENCY DEPT VISIT LOW MDM: CPT

## 2018-03-03 PROCEDURE — 80053 COMPREHEN METABOLIC PANEL: CPT | Performed by: EMERGENCY MEDICINE

## 2018-03-03 NOTE — ED NOTES
First cath attempt unsuccessful due to meeting resistance upon insertion with 16 F regular wilkinson cath. Second attempt with 18F coude successful with only some resistance. Draining dark yellow, clear urine.

## 2018-03-03 NOTE — DISCHARGE INSTRUCTIONS

## 2018-03-03 NOTE — ED PROVIDER NOTES
HPI Comments: 80 y.o. male with past medical history significant for osteoporosis, colon polyp, OCD, hypercholesterolemia, urinary frequency, hemorrhoids, anxiety, diverticulosis, insomnia, SUSAN on CPAP, and mesenteric cyst who presents to the ED with chief complaint of urinary pain. Patient reports undergoing a \"laser surgery to remove calcium deposits in (his) bladder\" ~12 days ago at [facility]. Patient reports being seen by [facility] ~10 days ago for urinary pain and lower back pain; reports having a wilkinson catheter placed at that time. Patient reports his wilkinson catheter was removed ~2 days ago; reports he was able to urinate with minimal pain at that time. Patient reports urgency and dysuria with onset \"last night,\" reports needing to get up \"six times last night\" and being able to urinate \"just enough to make the pain better. \" Patient reports currently being on Cefuroxime. Patient denies any back pain, fever, nausea, vomiting, and hematuria in the last ~2 days. There are no other acute medical concerns at this time. PCP: Rj Montelongo MD    Note written by Kiana Patton, as dictated by Ruthy Rudolph MD 3:25 PM   The history is provided by the patient. 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    Muscle pain     OCD (obsessive compulsive disorder)     SUSAN on CPAP 8/8/2013    Osteoporosis     Urinary frequency        Past Surgical History:   Procedure Laterality Date    ENDOSCOPY, COLON, DIAGNOSTIC      12/09;  [facility]    HX COLONOSCOPY  2008    HX ORTHOPAEDIC      at 6years old broke arm    HX TONSIL AND ADENOIDECTOMY      about 9years old         Family History:   Problem Relation Age of Onset    Heart Disease Father      heart attack age 61    Psychiatric Disorder Mother     Diabetes Daughter        Social History     Social History    Marital status:      Spouse name: N/A    Number of children: N/A    Years of education: N/A     Occupational History    Not on file. Social History Main Topics    Smoking status: Former Smoker     Quit date: 3/26/1972    Smokeless tobacco: Never Used    Alcohol use Yes      Comment: <1    Drug use: No    Sexual activity: Yes     Partners: Female     Other Topics Concern    Not on file     Social History Narrative         ALLERGIES: Sulfa (sulfonamide antibiotics)    Review of Systems   Constitutional: Negative for activity change, chills and fever. HENT: Negative for nosebleeds, sore throat, trouble swallowing and voice change. Eyes: Negative for visual disturbance. Respiratory: Negative for shortness of breath. Cardiovascular: Negative for chest pain and palpitations. Gastrointestinal: Negative for abdominal pain, constipation, diarrhea, nausea and vomiting. Genitourinary: Positive for difficulty urinating, dysuria and urgency. Negative for hematuria. Musculoskeletal: Negative for back pain, neck pain and neck stiffness. Skin: Negative for color change. Allergic/Immunologic: Negative for immunocompromised state. Neurological: Negative for dizziness, seizures, syncope, weakness, light-headedness, numbness and headaches. Psychiatric/Behavioral: Negative for behavioral problems, confusion, hallucinations, self-injury and suicidal ideas. All other systems reviewed and are negative. Vitals:    03/03/18 1511   BP: 108/69   Pulse: (!) 102   Resp: 18   Temp: 98.6 °F (37 °C)   SpO2: 96%   Weight: 71.7 kg (158 lb)   Height: 5' 7\" (1.702 m)            Physical Exam   Constitutional: He is oriented to person, place, and time. He appears well-developed and well-nourished. No distress. Little cachectic. HENT:   Head: Normocephalic and atraumatic. Eyes: Pupils are equal, round, and reactive to light. Neck: Normal range of motion.  Neck supple. Cardiovascular: Normal rate, regular rhythm and normal heart sounds. Exam reveals no gallop and no friction rub. No murmur heard. Pulmonary/Chest: Effort normal and breath sounds normal. No respiratory distress. He has no wheezes. Abdominal: Soft. Bowel sounds are normal. He exhibits no distension. There is no tenderness. There is no rebound and no guarding. No abdominal tenderness. Musculoskeletal: Normal range of motion. Neurological: He is alert and oriented to person, place, and time. Skin: Skin is warm. No rash noted. He is not diaphoretic. Psychiatric: He has a normal mood and affect. His behavior is normal. Judgment and thought content normal.   Nursing note and vitals reviewed. Note written by Kiana Hays, as dictated by Juliana Nunez MD 3:32 PM      [facility]      ED Course     This is an 51-year-old male with past medical history, review of systems, physical exam as above, presenting with complaints of cough urinating, and the setting of recent laser therapy, and indwelling Fuller catheter, catheter removed several days ago. Patient states urgency, frequency, with little flow. He states he is currently taking cefuroxime. He states the discomfort is resolved after urinating, denies hematuria, fevers, chills, nausea, vomiting, back pain. Physical exam remarkable for elderly male, in no acute distress, with a soft nontender abdomen, clear breath sounds. Will obtain CBC, CMP, UA, bladder scan. Unable to be sure and, [address] Fuller catheter, and make a disposition based on the patient's diagnostics and response to therapy. Procedures    6:28 PM  Fuller placed with 1L output, patient symptoms relieved. Elevated WBC without other signs of infections, CXR wnl. Will send urine for cx, and advise patient to follow with Urology Monday.

## 2018-03-03 NOTE — ED TRIAGE NOTES
Pt presents with complaints of difficulty urinating after catheter removed on Thursday. Pt states last night he woke up frequently having to urinate with minimal output.

## 2018-03-27 ENCOUNTER — OFFICE VISIT (OUTPATIENT)
Dept: INTERNAL MEDICINE CLINIC | Age: 83
End: 2018-03-27

## 2018-03-27 VITALS
HEIGHT: 67 IN | RESPIRATION RATE: 16 BRPM | WEIGHT: 154.4 LBS | DIASTOLIC BLOOD PRESSURE: 60 MMHG | BODY MASS INDEX: 24.23 KG/M2 | TEMPERATURE: 97.9 F | SYSTOLIC BLOOD PRESSURE: 127 MMHG | HEART RATE: 73 BPM | OXYGEN SATURATION: 94 %

## 2018-03-27 DIAGNOSIS — M25.562 ARTHRALGIA OF LEFT KNEE: ICD-10-CM

## 2018-03-27 RX ORDER — SILODOSIN 8 MG/1
CAPSULE ORAL
COMMUNITY
Start: 2018-03-22

## 2018-03-27 NOTE — PROGRESS NOTES
1. Have you been to the ER, urgent care clinic since your last visit? Hospitalized since your last visit? No    2. Have you seen or consulted any other health care providers outside of the [address] [place] [address] since your last visit? Include any pap smears or colon screening.  No   Chief Complaint   Patient presents with    Other     blood in stool    Shoulder Pain     right shoulder for 1 week    Knee Pain     left knee for the last 2 days     Not fasting

## 2018-03-27 NOTE — MR AVS SNAPSHOT
[address] [place] [address] [address] [place] [address] 
[phone] Patient: Hallie Whitlock MRN: [id number] BOD:5[birth year] Visit Information Date & Time Provider Department Dept. Phone Encounter #  
 3/27/2018  8:30 AM Celeste Coronado MD [facility] [phone] [id number] Upcoming Health Maintenance Date Due DTaP/Tdap/Td series (1 - Tdap) 3/7/1953 COLONOSCOPY 12/1/2014 GLAUCOMA SCREENING Q2Y 12/1/2015 Influenza Age 5 to Adult 8/1/2017 MEDICARE YEARLY EXAM 9/15/2018 Allergies as of 3/27/2018  Review Complete On: 3/27/2018 By: Celeste Coronado MD  
  
 Severity Noted Reaction Type Reactions Sulfa (Sulfonamide Antibiotics)  09/12/2011    Unknown (comments) Current Immunizations  Reviewed on 2/6/2017 Name Date Influenza High Dose Vaccine PF 10/1/2016 Influenza Vaccine 9/30/2013 Pneumococcal Conjugate (PCV-13) 10/6/2015 Pneumococcal Vaccine (Unspecified Type) 7/8/2001 Zoster Vaccine, Live 1/2/2009 Not reviewed this visit You Were Diagnosed With   
  
 Codes Comments Rotator cuff syndrome of right shoulder and allied disorders    -  Primary ICD-10-CM: M75.101 ICD-9-CM: 726.10 Arthralgia of left knee     ICD-10-CM: E54.258 ICD-9-CM: 719.46 Vitals BP Pulse Temp Resp Height(growth percentile) Weight(growth percentile) 127/60 (BP 1 Location: Left arm, BP Patient Position: At rest) 73 97.9 °F (36.6 °C) (Oral) 16 5' 7\" (1.702 m) 154 lb 6.4 oz (70 kg) SpO2 BMI Smoking Status 94% 24.18 kg/m2 Former Smoker BMI and BSA Data Body Mass Index Body Surface Area  
 24.18 kg/m 2 1.82 m 2 [phone] Your Updated Medication List  
  
   
This list is accurate as of 3/27/18  9:12 AM.  Always use your most recent med list.  
  
  
 aspirin delayed-release 81 mg tablet Take 81 mg by mouth daily. CALCIUM + D PO Take  by mouth.  
  
 ciclopirox 8 % solution Commonly known as:  PENLAC  
  
 COCONUT OIL PO Take  by mouth. cpap machine kit  
by Does Not Apply route. escitalopram oxalate 10 mg tablet Commonly known as:  Madalyn Mend Take 1 Tab by mouth daily. FISH OIL 1,000 mg Cap Generic drug:  omega-3 fatty acids-vitamin e Take 1 Cap by mouth two (2) times a day. fluticasone 50 mcg/actuation nasal spray Commonly known as:  Memory Lust 2 Sprays by Both Nostrils route daily. FOSAMAX 70 mg tablet Generic drug:  alendronate Take  by mouth.  
  
 latanoprost 0.005 % ophthalmic solution Commonly known as:  Sam Velazquez Administer 1 Drop to left eye nightly. MULTIVITAMIN PO Take  by mouth. PREVIDENT 0.2 % Soln Generic drug:  sodium fluoride (dental rinse) RAPAFLO 8 mg capsule Generic drug:  silodosin  
  
 simvastatin 10 mg tablet Commonly known as:  ZOCOR  
TAKE ONE TABLET BY MOUTH EVERY EVENING  
  
 VITAMIN D2 50,000 unit capsule Generic drug:  ergocalciferol Take 50,000 Units by mouth. Vitamin D every 10 days. We Performed the Following REFERRAL TO PHYSICAL THERAPY [CNA92 Custom] Referral Information Referral ID Referred By Referred To  
  
 [id number] [facility] [address] [place], [address] [place] [address] Pkwy Phone: [phone] Fax: [phone] Visits Status Start Date End Date 1 New Request 3/27/18 3/27/19 If your referral has a status of pending review or denied, additional information will be sent to support the outcome of this decision. Introducing [facility]! Dear Teofilo Decker: Thank you for requesting a [employer] account. Our records indicate that you already have an active [employer] account.   You can access your account anytime at [email or web address] Did you know that you can access your hospital and ER discharge instructions at any time in [employer]? You can also review all of your test results from your hospital stay or ER visit. Additional Information If you have questions, please visit the Frequently Asked Questions section of the [employer] website at [email or web address] Remember, [employer] is NOT to be used for urgent needs. For medical emergencies, dial 911. Now available from your iPhone and Android! Please provide this summary of care documentation to your next provider. Your primary care clinician is listed as Cayden Hand. If you have any questions after today's visit, please call [phone].

## 2018-03-27 NOTE — PROGRESS NOTES
Chief Complaint   Patient presents with    Other     blood in stool    Shoulder Pain     right shoulder for 1 week    Knee Pain     left knee for the last 2 days     Urological issues for weeks, bladder stones   Retention, abd pain turp catheter and now doing well    2 weeks ago brpr, had a narcotic and azo    Left knee pain with walking and right shoulder pain are new issues both recent and recurrent      Vitals:    03/27/18 0842   BP: 127/60   Pulse: 73   Resp: 16   Temp: 97.9 °F (36.6 °C)   TempSrc: Oral   SpO2: 94%   Weight: 154 lb 6.4 oz (70 kg)   Height: 5' 7\" (1.702 m)     A right shoulder exam was performed. GENERAL: no acute distress  SWELLING: none  DEFORMITY: none  TENDERNESS: moderate  ROM: external rotation at 90 degrees abduction painful and reduced  STRENGTH: limited by pain  Knee exam: the injured knee reveals normal exam, no swelling, tenderness, instability; ligaments intact, FROM. X-ray is negative for fracture. 1. Rotator cuff syndrome of right shoulder and allied disorders  No surgery now was suggested in past  - REFERRAL TO PHYSICAL THERAPY    2.  Arthralgia of left knee  Quad strengthening would help  - REFERRAL TO PHYSICAL THERAPY

## 2018-04-14 ENCOUNTER — HOSPITAL ENCOUNTER (EMERGENCY)
Age: 83
Discharge: HOME | End: 2018-04-14
Attending: EMERGENCY MEDICINE
Payer: MEDICARE

## 2018-04-14 VITALS
HEIGHT: 67 IN | SYSTOLIC BLOOD PRESSURE: 113 MMHG | DIASTOLIC BLOOD PRESSURE: 75 MMHG | WEIGHT: 147 LBS | BODY MASS INDEX: 23.07 KG/M2 | HEART RATE: 80 BPM | TEMPERATURE: 98.1 F | OXYGEN SATURATION: 95 % | RESPIRATION RATE: 18 BRPM

## 2018-04-14 DIAGNOSIS — R33.9 URINARY RETENTION: Primary | ICD-10-CM

## 2018-04-14 DIAGNOSIS — N39.0 URINARY TRACT INFECTION WITHOUT HEMATURIA, SITE UNSPECIFIED: ICD-10-CM

## 2018-04-14 LAB
APPEARANCE UR: ABNORMAL
BACTERIA URNS QL MICRO: NEGATIVE /HPF
BILIRUB UR QL: NEGATIVE
COLOR UR: ABNORMAL
EPITH CASTS URNS QL MICRO: ABNORMAL /LPF
GLUCOSE UR STRIP.AUTO-MCNC: NEGATIVE MG/DL
HGB UR QL STRIP: ABNORMAL
HYALINE CASTS URNS QL MICRO: ABNORMAL /LPF (ref 0–5)
KETONES UR QL STRIP.AUTO: ABNORMAL MG/DL
LEUKOCYTE ESTERASE UR QL STRIP.AUTO: ABNORMAL
NITRITE UR QL STRIP.AUTO: NEGATIVE
PH UR STRIP: 7 [PH] (ref 5–8)
PROT UR STRIP-MCNC: 100 MG/DL
RBC #/AREA URNS HPF: ABNORMAL /HPF (ref 0–5)
SP GR UR REFRACTOMETRY: 1.02 (ref 1–1.03)
UROBILINOGEN UR QL STRIP.AUTO: 0.2 EU/DL (ref 0.2–1)
WBC URNS QL MICRO: >100 /HPF (ref 0–4)

## 2018-04-14 PROCEDURE — 51702 INSERT TEMP BLADDER CATH: CPT

## 2018-04-14 PROCEDURE — 99282 EMERGENCY DEPT VISIT SF MDM: CPT

## 2018-04-14 PROCEDURE — 87186 SC STD MICRODIL/AGAR DIL: CPT | Performed by: PHYSICIAN ASSISTANT

## 2018-04-14 PROCEDURE — 74011000250 HC RX REV CODE- 250: Performed by: PHYSICIAN ASSISTANT

## 2018-04-14 PROCEDURE — 81001 URINALYSIS AUTO W/SCOPE: CPT | Performed by: EMERGENCY MEDICINE

## 2018-04-14 PROCEDURE — 77030005518 HC CATH URETH FOL 2W BARD -B

## 2018-04-14 PROCEDURE — 87086 URINE CULTURE/COLONY COUNT: CPT | Performed by: PHYSICIAN ASSISTANT

## 2018-04-14 PROCEDURE — 77030034850

## 2018-04-14 PROCEDURE — 51798 US URINE CAPACITY MEASURE: CPT

## 2018-04-14 PROCEDURE — 87077 CULTURE AEROBIC IDENTIFY: CPT | Performed by: PHYSICIAN ASSISTANT

## 2018-04-14 RX ORDER — LIDOCAINE HYDROCHLORIDE 20 MG/ML
JELLY TOPICAL
Status: COMPLETED | OUTPATIENT
Start: 2018-04-14 | End: 2018-04-14

## 2018-04-14 RX ADMIN — LIDOCAINE HYDROCHLORIDE: 20 JELLY TOPICAL at 15:24

## 2018-04-14 NOTE — Clinical Note
Leave the catheter in place for the next several days and follow up with own urologist this coming week for recheck of the catheter. Continue your current medications as directed.

## 2018-04-14 NOTE — ED NOTES
2:32 PM  I have evaluated the patient as the Provider in Triage. I have reviewed His vital signs and the triage nurse assessment. I have talked with the patient and any available family and advised that I am the provider in triage and have ordered the appropriate study to initiate their work up based on the clinical presentation during my assessment. I have advised that the patient will be accommodated in the Main ED as soon as possible. I have also requested to contact the triage nurse or myself immediately if the patient experiences any changes in their condition during this brief waiting period. Hx of urinary retention. Currently being treated for UTI. Here for urinary retention.        Mary Carrera PA-C

## 2018-04-14 NOTE — ED PROVIDER NOTES
HPI Comments: 80 y.o. male with past medical history significant for osteoporosis, colon polyp, diverticulosis, and urinary frequency who presents ambulatory from home with chief complaint of urinary rentention. Patient reports onset of being unable to urinate at 04:00 this morning that gradually became worse. Patient also reports onset of urinary frequency a few days ago and was evaluated at the [facility] yesterday. Patient had a UTI and started taking Cefdinir twice a day. Of note, patient has extensive genitourinary history in the last 6 months, including procedures for his bladder and prostate and several urinary catheterizations. Pertinent negatives include fever and shortness of breath. There are no other acute medical concerns at this time. Social hx: Previous tobacco use (quit date: 03/26/72); alcohol use; denies illicit drug use  PCP: Lebron Callas, MD    Note written by Kiana Vazquez, as dictated by Sarah Leija MD 3:20 PM      The history is provided by the patient. No  was used. 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    Muscle pain     OCD (obsessive compulsive disorder)     SUSAN on CPAP 8/8/2013    Osteoporosis     Urinary frequency        Past Surgical History:   Procedure Laterality Date    ENDOSCOPY, COLON, DIAGNOSTIC      12/09;  [facility]    HX COLONOSCOPY  2008    HX ORTHOPAEDIC      at 6years old broke arm    HX TONSIL AND ADENOIDECTOMY      about 9years old         Family History:   Problem Relation Age of Onset    Heart Disease Father      heart attack age 61    Psychiatric Disorder Mother     Diabetes Daughter        Social History     Social History    Marital status:      Spouse name: N/A    Number of children: N/A    Years of education: N/A     Occupational History    Not on file. Social History Main Topics    Smoking status: Former Smoker     Quit date: 3/26/1972    Smokeless tobacco: Never Used    Alcohol use Yes      Comment: <1    Drug use: No    Sexual activity: Yes     Partners: Female     Other Topics Concern    Not on file     Social History Narrative         ALLERGIES: Sulfa (sulfonamide antibiotics)    Review of Systems   Constitutional: Negative for activity change, appetite change, fatigue and fever. HENT: Negative for ear pain, facial swelling, sore throat and trouble swallowing. Eyes: Negative for pain, discharge and visual disturbance. Respiratory: Negative for chest tightness, shortness of breath and wheezing. Cardiovascular: Negative for chest pain and palpitations. Gastrointestinal: Negative for abdominal pain, blood in stool, nausea and vomiting. Genitourinary: Positive for decreased urine volume and difficulty urinating. Negative for flank pain and hematuria. Frequency: resolved. Musculoskeletal: Negative for arthralgias, joint swelling, myalgias and neck pain. Skin: Negative for color change and rash. Neurological: Negative for dizziness, weakness, numbness and headaches. Hematological: Negative for adenopathy. Does not bruise/bleed easily. Psychiatric/Behavioral: Negative for behavioral problems, confusion and sleep disturbance. All other systems reviewed and are negative. Vitals:    04/14/18 1427   BP: 150/75   Pulse: 88   Resp: 18   Temp: 98.2 °F (36.8 °C)   SpO2: 97%   Weight: 66.7 kg (147 lb)   Height: 5' 7\" (1.702 m)            Physical Exam   Constitutional: He is oriented to person, place, and time. He appears well-developed and well-nourished. He appears distressed. HENT:   Head: Normocephalic and atraumatic. Nose: Nose normal.   Mouth/Throat: Oropharynx is clear and moist.   Eyes: Conjunctivae and EOM are normal. Pupils are equal, round, and reactive to light. No scleral icterus.    Neck: Normal range of motion. Neck supple. No JVD present. No tracheal deviation present. No thyromegaly present. No carotid bruits noted. Cardiovascular: Normal rate, regular rhythm, normal heart sounds and intact distal pulses. Exam reveals no gallop and no friction rub. No murmur heard. Pulmonary/Chest: Effort normal and breath sounds normal. No respiratory distress. He has no wheezes. He has no rales. He exhibits no tenderness. Abdominal: Soft. Bowel sounds are normal. He exhibits no distension and no mass. There is no tenderness. There is no rebound and no guarding. Had abdominal cyst for a long time that does not give him any problems  There is fullness and pain over the symphysis consistent with distended bladder. Genitourinary:   Genitourinary Comments: Bladder is enlarged and tender; can feel bladder above pubic symphysis   Musculoskeletal: Normal range of motion. He exhibits no edema or tenderness. Lymphadenopathy:     He has no cervical adenopathy. Neurological: He is alert and oriented to person, place, and time. He has normal reflexes. No cranial nerve deficit. Coordination normal.   Skin: Skin is warm and dry. No rash noted. No erythema. Psychiatric: He has a normal mood and affect. His behavior is normal. Judgment and thought content normal.   Nursing note and vitals reviewed.    Note written by Kiana Brand, as dictated by Giovani Foley MD 3:20 PM      MDM  Number of Diagnoses or Management Options  Urinary retention: established and worsening  Urinary tract infection without hematuria, site unspecified: established and worsening     Amount and/or Complexity of Data Reviewed  Clinical lab tests: ordered and reviewed  Decide to obtain previous medical records or to obtain history from someone other than the patient: yes  Review and summarize past medical records: yes  Discuss the patient with other providers: yes    Risk of Complications, Morbidity, and/or Mortality  Presenting problems: moderate  Diagnostic procedures: moderate  Management options: moderate    Patient Progress  Patient progress: stable        ED Course       Procedures  Patient was cathed and placed on a leg bag.

## 2018-04-16 LAB
BACTERIA SPEC CULT: ABNORMAL
CC UR VC: ABNORMAL
SERVICE CMNT-IMP: ABNORMAL

## 2018-04-16 NOTE — CALL BACK NOTE
[facility] Emergency Department Follow Up Call Record    Discharged to : Home/Family Home/Home Health/Skilled Facility/Rehab/Assisted Living/Other_Home______  1) Did you receive your discharge instructions? Yes        2) Do you understand them? Yes         3) Are you able to follow them? Yes          If NO, what can I clarify for you? 4) Do you understand your diagnosis? Yes         5) Do you know which symptoms should prompt you to call the doctor? Yes     6) Were you able to fill and  any medications that were prescribed? Not applicable     7) You were prescribed _n/a_________for ____________________. Common side effects of this medication are____________________. This is not a complete list so please review the forms given from the pharmacy for a complete list.      8) Are there any questions about your medications? No           Have you scheduled any recommended doctors appointments (specialty, PCP) YES  If NO, what barriers are you encountering (transportation/lost contact info/cost/  didnt think necessary/no PCP  9) If discharged with Home Health, has the agency contacted you to schedule visit? Not applicable  10) Is there anyone available to help you at home (meals, errands, transportation    monitoring) (adult children, neighbors, private duty companions) Yes    11) Are you on a special diet? No         If YES, do you understand the requirements for this diet? Education provided? 12) If presented with cough, bronchitis, COPD, asthma, is it ok to ask that the   respiratory disease management educator call you? Not applicable      13)  A) If presented with fall, were you issued an assistive device in the ED    Are you using? Yes  Leg bag device attached to wilkinson catheter  B) If given RX for device, have you obtained? Yes, applied in ED      If NO, barriers? C) Therapist recommended:   Are you able to implement the suggestions? Yes        If NO, barriers to implementation?      D) Are you having any difficulties with mobility inside your home?     (steps, bed, tub)No   If YES, ask if the [facility] PT can contact patient and good time and number?  14)  At the end of your discharge instructions, there is information about accessing [facility] SERVICES, have you had a chance to review those? Yes         Do you have any questions about signing up for this service? NO   We encourage our patients to be active participants in their healthcare and this site is one of the ways to do that. It will allow you to access parts of your medical record, email your doctors office, schedule appointments, and request medications refills . 15) Are there any other questions that I can answer for you regarding    your Emergency department visit?  NO             Estimated Call Time:____2:56 PM  _______________ Date/Time:_______________

## 2018-04-22 DIAGNOSIS — F32.A ANXIETY AND DEPRESSION: ICD-10-CM

## 2018-04-22 DIAGNOSIS — F41.9 ANXIETY AND DEPRESSION: ICD-10-CM

## 2018-04-22 RX ORDER — ESCITALOPRAM OXALATE 10 MG/1
TABLET ORAL
Qty: 90 TAB | Refills: 1 | Status: SHIPPED | OUTPATIENT
Start: 2018-04-22 | End: 2018-11-05 | Stop reason: SDUPTHER

## 2018-06-20 ENCOUNTER — OFFICE VISIT (OUTPATIENT)
Dept: INTERNAL MEDICINE CLINIC | Age: 83
End: 2018-06-20

## 2018-06-20 VITALS
WEIGHT: 151 LBS | HEART RATE: 78 BPM | RESPIRATION RATE: 14 BRPM | DIASTOLIC BLOOD PRESSURE: 67 MMHG | SYSTOLIC BLOOD PRESSURE: 129 MMHG | TEMPERATURE: 97.4 F | HEIGHT: 66 IN | BODY MASS INDEX: 24.27 KG/M2 | OXYGEN SATURATION: 94 %

## 2018-06-20 DIAGNOSIS — R25.2 MUSCLE CRAMPS AT NIGHT: Primary | ICD-10-CM

## 2018-06-20 NOTE — PATIENT INSTRUCTIONS
Preventing Falls: Care Instructions  Your Care Instructions    Getting around your home safely can be a challenge if you have injuries or health problems that make it easy for you to fall. Loose rugs and furniture in walkways are among the dangers for many older people who have problems walking or who have poor eyesight. People who have conditions such as arthritis, osteoporosis, or dementia also have to be careful not to fall. You can make your home safer with a few simple measures. Follow-up care is a key part of your treatment and safety. Be sure to make and go to all appointments, and call your doctor if you are having problems. It's also a good idea to know your test results and keep a list of the medicines you take. How can you care for yourself at home? Taking care of yourself  · You may get dizzy if you do not drink enough water. To prevent dehydration, drink plenty of fluids, enough so that your urine is light yellow or clear like water. Choose water and other caffeine-free clear liquids. If you have kidney, heart, or liver disease and have to limit fluids, talk with your doctor before you increase the amount of fluids you drink. · Exercise regularly to improve your strength, muscle tone, and balance. Walk if you can. Swimming may be a good choice if you cannot walk easily. · Have your vision and hearing checked each year or any time you notice a change. If you have trouble seeing and hearing, you might not be able to avoid objects and could lose your balance. · Know the side effects of the medicines you take. Ask your doctor or pharmacist whether the medicines you take can affect your balance. Sleeping pills or sedatives can affect your balance. · Limit the amount of alcohol you drink. Alcohol can impair your balance and other senses. · Ask your doctor whether calluses or corns on your feet need to be removed.  If you wear loose-fitting shoes because of calluses or corns, you can lose your balance and fall. · Talk to your doctor if you have numbness in your feet. Preventing falls at home  · Remove raised doorway thresholds, throw rugs, and clutter. Repair loose carpet or raised areas in the floor. · Move furniture and electrical cords to keep them out of walking paths. · Use nonskid floor wax, and wipe up spills right away, especially on ceramic tile floors. · If you use a walker or cane, put rubber tips on it. If you use crutches, clean the bottoms of them regularly with an abrasive pad, such as steel wool. · Keep your house well lit, especially [place], and outside walkways. Use night-lights in areas such as hallways and bathrooms. Add extra light switches or use remote switches (such as switches that go on or off when you clap your hands) to make it easier to turn lights on if you have to get up during the night. · Install sturdy handrails on stairways. · Move items in your cabinets so that the things you use a lot are on the lower shelves (about waist level). · Keep a cordless phone and a flashlight with new batteries by your bed. If possible, put a phone in each of the main rooms of your house, or carry a cell phone in case you fall and cannot reach a phone. Or, you can wear a device around your neck or wrist. You push a button that sends a signal for help. · Wear low-heeled shoes that fit well and give your feet good support. Use footwear with nonskid soles. Check the heels and soles of your shoes for wear. Repair or replace worn heels or soles. · Do not wear socks without shoes on wood floors. · Walk on the grass when the sidewalks are slippery. If you live in an area that gets snow and ice in the winter, sprinkle salt on slippery steps and sidewalks. Preventing falls in the bath  · Install grab bars and nonskid mats inside and outside your shower or tub and near the toilet and sinks. · Use shower chairs and bath benches.   · Use a hand-held shower head that will allow you to sit while showering. · Get into a tub or shower by putting the weaker leg in first. Get out of a tub or shower with your strong side first.  · Repair loose toilet seats and consider installing a raised toilet seat to make getting on and off the toilet easier. · Keep your bathroom door unlocked while you are in the shower. Where can you learn more? Go to http://sherwin-caprice.info/. Enter [id number] [id number] in the search box to learn more about \"Preventing Falls: Care Instructions. \"  Current as of: May 12, 2017  Content Version: 11.4  © 8391-0298 [employer]. Care instructions adapted under license by [employer] (which disclaims liability or warranty for this information). If you have questions about a medical condition or this instruction, always ask your healthcare professional. [place] [address] any warranty or liability for your use of this information. How to Get Up Safely After a Fall: Care Instructions  Your Care Instructions    If you have injuries, health problems, or other reasons that may make it easy for you to fall at home, it is a good idea to learn how to get up safely after a fall. Learning how to get up correctly can help you avoid making an injury worse. Also, knowing what to do if you cannot get up can help you stay safe until help arrives. Follow-up care is a key part of your treatment and safety. Be sure to make and go to all appointments, and call your doctor if you are having problems. It's also a good idea to know your test results and keep a list of the medicines you take. How can you care for yourself after a fall? If you think you can get up  First lie still for a few minutes and think about how you feel. If your body feels okay and you think you can get up safely, follow the rest of the steps below:  1. Look for a chair or other piece of furniture that is close to you.   2. Roll onto your side and rest. Roll by turning your head in the direction you want to roll, move your shoulder and arm, then hip and leg in the same direction. 3. Lie still for a moment to let your blood pressure adjust.  4. Slowly push your upper body up, lift your head, and take a moment to rest.  5. Slowly get up on your hands and knees, and crawl to the chair or other stable piece of furniture. 6. Put your hands on the chair. 7. Move one foot forward, and place it flat on the floor. Your other leg should be bent with the knee on the floor. 8. Rise slowly, turn your body, and sit in the chair. Stay seated for a bit and think about how you feel. Call for help. Even if you feel okay, let someone know what happened to you. You might not know that you have a serious injury. If you cannot get up  1. If you think you are injured after a fall or you cannot get up, try not to panic. 2. Call out for help. 3. If you have a phone within reach or you have an emergency call device, use it to call for help. 4. If you do not have a phone within reach, try to slide yourself toward it. If you cannot get to the phone, try to slide toward a door or window or a place where you think you can be heard. 5. [place] or use an object to make noise so someone might hear you. 6. If you can reach something that you can use for a pillow, place it under your head. Try to stay warm by covering yourself with a blanket or clothing while you wait for help. When should you call for help? Call 911 anytime you think you may need emergency care. For example, call if:  ? · You passed out (lost consciousness). ? · You cannot get up after a fall. ? · You have severe pain. ?Call your doctor now or seek immediate medical care if:  ? · You have new or worse pain. ? · You are dizzy or lightheaded. ? · You hit your head. ? Watch closely for changes in your health, and be sure to contact your doctor if:  ? · You do not get better as expected. Where can you learn more?   Go to http://sherwin-caprice.info/. Enter X851 in the search box to learn more about \"How to Get Up Safely After a Fall: Care Instructions. \"  Current as of: May 12, 2017  Content Version: 11.4  © 6355-1962 [employer]. Care instructions adapted under license by [employer] (which disclaims liability or warranty for this information). If you have questions about a medical condition or this instruction, always ask your healthcare professional. [address] any warranty or liability for your use of this information.

## 2018-06-20 NOTE — PROGRESS NOTES
Chief Complaint   Patient presents with    Tingling     and muscle constrictions in toes that caused toes to bend backwards during sleep     Subjective:  Mr. Ramiro Villanueva is an 80year old retired [employer] . He was on a retreat in [place], returned a couple of days ago, long flight home. He's been having some urinary issues during his trip. He has an appointment with [facility] tomorrow. He had really bad leg cramps the first night he got back home. He thinks it was Sunday night. They've resolved now and he's had none for two days. He walks a mile every morning. He wasn't doing as much walking while he was in [place] since his clothing never showed up till the day before they came home. He is feeling better now. Physical Examination:  His blood pressure was normal.  His feet were warm. He had no edema, no tenderness in the calf. ROM of ankles normal.    Impression:  1. Leg cramps, possibly exacerbated by the long flight, although said he did get up frequently and go to the bathroom. Plan:  1. I told him to use either quinine or tonic water at night. 2. To continue to be active. 3. If his symptoms continue I gave him an order for CMP and magnesium. If symptoms resolve I told him it's not necessary to do those tests.     Vitals:    06/20/18 1613   BP: 129/67   Pulse: 78   Resp: 14   Temp: 97.4 °F (36.3 °C)   TempSrc: Oral   SpO2: 94%   Weight: 151 lb (68.5 kg)   Height: 5' 6\" (1.676 m)

## 2018-06-20 NOTE — PROGRESS NOTES
Chief Complaint   Patient presents with    Tingling     and muscle constrictions in toes that caused toes to bend backwards during sleep     Health Maintenance Due   Topic Date Due    DTaP/Tdap/Td series (1 - Tdap) [birth year]07/1953    COLONOSCOPY  12/01/2014    GLAUCOMA SCREENING Q2Y  12/01/2015     Coordination of Care Questions    1. Have you been to the ER, urgent care clinic since your last visit? No       Hospitalized since your last visit? No    2. Have you seen or consulted any other health care providers outside of the [address] [place] [address] since your last visit? Include any pap smears or colon screening.  [facility], had calcium deposits removed from bladder

## 2018-07-30 LAB — CREATININE, EXTERNAL: 0.64

## 2018-08-16 DIAGNOSIS — E78.00 PURE HYPERCHOLESTEROLEMIA: ICD-10-CM

## 2018-08-16 RX ORDER — SIMVASTATIN 10 MG/1
TABLET, FILM COATED ORAL
Qty: 90 TAB | Refills: 1 | Status: SHIPPED | OUTPATIENT
Start: 2018-08-16 | End: 2019-02-07 | Stop reason: SDUPTHER

## 2018-10-29 ENCOUNTER — OFFICE VISIT (OUTPATIENT)
Dept: INTERNAL MEDICINE CLINIC | Age: 83
End: 2018-10-29

## 2018-10-29 ENCOUNTER — HOSPITAL ENCOUNTER (OUTPATIENT)
Dept: LAB | Age: 83
Discharge: HOME | End: 2018-10-29
Payer: MEDICARE

## 2018-10-29 VITALS
DIASTOLIC BLOOD PRESSURE: 58 MMHG | TEMPERATURE: 96.2 F | WEIGHT: 156.4 LBS | BODY MASS INDEX: 25.13 KG/M2 | SYSTOLIC BLOOD PRESSURE: 128 MMHG | OXYGEN SATURATION: 96 % | HEIGHT: 66 IN | RESPIRATION RATE: 16 BRPM | HEART RATE: 66 BPM

## 2018-10-29 DIAGNOSIS — N13.8 BPH WITH URINARY OBSTRUCTION: ICD-10-CM

## 2018-10-29 DIAGNOSIS — F41.9 ANXIETY AND DEPRESSION: ICD-10-CM

## 2018-10-29 DIAGNOSIS — K66.8 MESENTERIC CYST: ICD-10-CM

## 2018-10-29 DIAGNOSIS — F32.A ANXIETY AND DEPRESSION: ICD-10-CM

## 2018-10-29 DIAGNOSIS — N40.1 BPH WITH URINARY OBSTRUCTION: ICD-10-CM

## 2018-10-29 DIAGNOSIS — Z13.39 SCREENING FOR ALCOHOLISM: ICD-10-CM

## 2018-10-29 DIAGNOSIS — I65.29 STENOSIS OF CAROTID ARTERY, UNSPECIFIED LATERALITY: ICD-10-CM

## 2018-10-29 DIAGNOSIS — Z79.899 HIGH RISK MEDICATION USE: ICD-10-CM

## 2018-10-29 DIAGNOSIS — Z00.00 MEDICARE ANNUAL WELLNESS VISIT, SUBSEQUENT: ICD-10-CM

## 2018-10-29 DIAGNOSIS — E78.00 PURE HYPERCHOLESTEROLEMIA: Primary | ICD-10-CM

## 2018-10-29 PROCEDURE — 36415 COLL VENOUS BLD VENIPUNCTURE: CPT

## 2018-10-29 PROCEDURE — 80053 COMPREHEN METABOLIC PANEL: CPT

## 2018-10-29 PROCEDURE — 80061 LIPID PANEL: CPT

## 2018-10-29 PROCEDURE — 85025 COMPLETE CBC W/AUTO DIFF WBC: CPT

## 2018-10-29 NOTE — PATIENT INSTRUCTIONS
Medicare Wellness Visit, Male    The best way to live healthy is to have a lifestyle where you eat a well-balanced diet, exercise regularly, limit alcohol use, and quit all forms of tobacco/nicotine, if applicable. Regular preventive services are another way to keep healthy. Preventive services (vaccines, screening tests, monitoring & exams) can help personalize your care plan, which helps you manage your own care. Screening tests can find health problems at the earliest stages, when they are easiest to treat. [facility] follows the current, evidence-based guidelines published by the [facility] ([facility]) when recommending preventive services for our patients. Because we follow these guidelines, sometimes recommendations change over time as research supports it. (For example, a prostate screening blood test is no longer routinely recommended for men with no symptoms.)  Of course, you and your doctor may decide to screen more often for some diseases, based on your risk and co-morbidities (chronic disease you are already diagnosed with). Preventive services for you include:  - Medicare offers their members a free annual wellness visit, which is time for you and your primary care provider to discuss and plan for your preventive service needs. Take advantage of this benefit every year!  -All adults over age 72 should receive the recommended pneumonia vaccines. Current USPSTF guidelines recommend a series of two vaccines for the best pneumonia protection.   -All adults should have a flu vaccine yearly and an ECG.  All adults age [address] and older should receive a shingles vaccine once in their lifetime.    -All adults age 38-68 who are overweight should have a diabetes screening test once every three years.   -Other screening tests & preventive services for persons with diabetes include: an eye exam to screen for diabetic retinopathy, a kidney function test, a foot exam, and stricter control over your cholesterol.   -Cardiovascular screening for adults with routine risk involves an electrocardiogram (ECG) at intervals determined by the provider.   -Colorectal cancer screening should be done for adults age 54-65 with no increased risk factors for colorectal cancer. There are a number of acceptable methods of screening for this type of cancer. Each test has its own benefits and drawbacks. Discuss with your provider what is most appropriate for you during your annual wellness visit. The different tests include: colonoscopy (considered the best screening method), a fecal occult blood test, a fecal DNA test, and sigmoidoscopy.  -All adults born between [facility] should be screened once for Hepatitis C.  -An Abdominal Aortic Aneurysm (AAA) Screening is recommended for men age 73-68 who has ever smoked in their lifetime.      Here is a list of your current Health Maintenance items (your personalized list of preventive services) with a due date:  Health Maintenance Due   Topic Date Due    DTaP/Tdap/Td  (1 - Tdap) 03[birth year]07/1953    Shingles Vaccine (1 of 2) 03/07/1982    Colonoscopy  12/01/2014    Glaucoma Screening   12/01/2015    Flu Vaccine  08/01/2018    Annual Well Visit  09/15/2018

## 2018-10-29 NOTE — PROGRESS NOTES
1. Have you been to the ER, urgent care clinic since your last visit? Hospitalized since your last visit? No 
 
2. Have you seen or consulted any other health care providers outside of the [address] [place] [address] since your last visit? Include any pap smears or colon screening. Yes. Dr [facility], someone for osteoporosis ( Dr Katrin Carter)

## 2018-10-29 NOTE — PROGRESS NOTES
This is the Subsequent Medicare Annual Wellness Exam, performed 12 months or more after the Initial AWV or the last Subsequent AWV    I have reviewed the patient's medical history in detail and updated the computerized patient record. 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 0.5 mL 0    simvastatin (ZOCOR) 10 mg tablet TAKE 1 TABLET EVERY EVENING 90 Tab 1    escitalopram oxalate (LEXAPRO) 10 mg tablet TAKE 1 TABLET DAILY 90 Tab 1    RAPAFLO 8 mg capsule       ciclopirox (PENLAC) 8 % solution       PREVIDENT 0.2 % soln       latanoprost (XALATAN) 0.005 % ophthalmic solution Administer 1 Drop to left eye nightly.  fluticasone (FLONASE) 50 mcg/actuation nasal spray 2 Sprays by Both Nostrils route daily. (Patient taking differently: 2 Sprays by Both Nostrils route daily as needed.) 1 Bottle 5    alendronate (FOSAMAX) 70 mg tablet Take 70 mg by mouth every ten (10) days. Indications: OSTEOPOROSIS IN MALE PATIENT      aspirin delayed-release 81 mg tablet Take 81 mg by mouth daily.  COCONUT OIL PO Take  by mouth.  MULTIVITAMIN PO Take  by mouth.  ergocalciferol (VITAMIN D) 50,000 unit capsule Take 50,000 Units by mouth every seven (7) days. Vitamin D every 10 days.       omega-3 fatty acids-vitamin e (FISH OIL) 1,000 mg Cap Take 1 Cap by mouth two (2) times a day.  cpap machine kit by Does Not Apply route.  CALCIUM CARBONATE/VITAMIN D3 (CALCIUM + D PO) Take  by mouth. Allergies   Allergen Reactions    Sulfa (Sulfonamide Antibiotics) Unknown (comments)     Family History   Problem Relation Age of Onset    Heart Disease Father         heart attack age 61    Psychiatric Disorder Mother     Diabetes Daughter      Social History     Tobacco Use    Smoking status: Former Smoker     Last attempt to quit: 3/26/1972     Years since quittin.6    Smokeless tobacco: Never Used   Substance Use Topics    Alcohol use: Yes     Comment: <1     Patient Active Problem List   Diagnosis Code    Osteoporosis M81.0    Colon polyp K63.5    Diverticulosis K57.90    OCD (obsessive compulsive disorder) F42.9    Allergic rhinitis, cause unspecified J30.9    Pure hypercholesterolemia E78.00    Carotid stenosis I65.29    Insomnia G47.00    SUSAN on CPAP G47.33, Z99.89    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 Functional Ability and Level of Safety:   Hearing Loss  The patient needs further evaluation. wifr feels an issue mhe has high frequency loss    Activities of Daily Living  The home contains: no safety equipment. Patient does total self care    Fall Risk  Fall Risk Assessment, last 12 mths 10/29/2018   Able to walk? Yes   Fall in past 12 months? Yes   Fall with injury?  No   Number of falls in past 12 months 3   Fall Risk Score 3       Abuse Screen  Patient is not abused    Cognitive Screening   Evaluation of Cognitive Function:  Has your family/caregiver stated any concerns about your memory: no  Normal    Patient Care Team   Patient Care Team:  David Garcia MD as PCP - General (Internal Medicine)  Michael Sarmiento MD (Cardiology)    Assessment/Plan   Education and counseling provided:  Are appropriate based on today's review and evaluation  End-of-Life planning (with patient's consent)    Diagnoses and all orders for this visit:    1. Medicare annual wellness visit, subsequent  -     pneumococcal 13 jerardo conj dip (PREVNAR 13, PF,) 0.5 mL syrg injection; 0.5 mL by IntraMUSCular route once for 1 dose. 2. Screening for alcoholism  -     [place] ANNUAL ALCOHOL SCREEN 15 MIN        Health Maintenance Due   Topic Date Due    DTaP/Tdap/Td series (1 - Tdap) 03/07/1953    Shingrix Vaccine Age 50> (1 of 2) 03/07/1982    COLONOSCOPY  12/01/2014    GLAUCOMA SCREENING Q2Y  12/01/2015    Influenza Age 9 to Adult  08/01/2018    MEDICARE YEARLY EXAM  09/15/2018     Tamanna Tanner is a 80 y.o. male with the following Problems and Medications. 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  0.5 mL 0    simvastatin (ZOCOR) 10 mg tablet TAKE 1 TABLET EVERY EVENING 90 Tab 1    escitalopram oxalate (LEXAPRO) 10 mg tablet TAKE 1 TABLET DAILY 90 Tab 1    RAPAFLO 8 mg capsule       ciclopirox (PENLAC) 8 % solution       PREVIDENT 0.2 % soln       latanoprost (XALATAN) 0.005 % ophthalmic solution Administer 1 Drop to left eye nightly.  fluticasone (FLONASE) 50 mcg/actuation nasal spray 2 Sprays by Both Nostrils route daily. (Patient taking differently: 2 Sprays by Both Nostrils route daily as needed.) 1 Bottle 5    alendronate (FOSAMAX) 70 mg tablet Take 70 mg by mouth every ten (10) days. Indications: OSTEOPOROSIS IN MALE PATIENT      COCONUT OIL PO Take  by mouth.  MULTIVITAMIN PO Take  by mouth.  ergocalciferol (VITAMIN D) 50,000 unit capsule Take 50,000 Units by mouth every seven (7) days. Vitamin D every 10 days.  omega-3 fatty acids-vitamin e (FISH OIL) 1,000 mg Cap Take 1 Cap by mouth two (2) times a day.  cpap machine kit by Does Not Apply route.  CALCIUM CARBONATE/VITAMIN D3 (CALCIUM + D PO) Take  by mouth. Seeing endocrine for bones insurance too expensive for osteo  Injectable drugs tolerates the fosamax   issues are better not self cathing any more and gets good urine output  On rapaflo  Has BPH   Flow OK now no more utis  No colon testing due to age had cologuard 2 years ago and age ?       Review of Systems - General ROS: positive for  - not enough sleep  Psychological ROS: pos  for - depression on ssri and wants to continue  Hematological and Lymphatic ROS: negative  Endocrine ROS: negative for - hair pattern changes, hot flashes or palpitations  Respiratory ROS: no cough, shortness of breath, or wheezing  Cardiovascular ROS: no chest pain or dyspnea on exertion  Gastrointestinal ROS: no abdominal pain, change in bowel habits, or black or bloody stools  Genito-Urinary ROS: no dysuria, trouble voiding, or hematuria  Musculoskeletal ROS: positive for - gait disturbance, joint pain, joint stiffness and joint swelling  Neurological ROS: no TIA or stroke symptoms  Dermatological ROS: negative for - mole changes, nail changes or skin lesion changes    Walks 2.7 miles per day still feels young  Vitals:    10/29/18 0907   BP: 128/58   Pulse: 66   Resp: 16   Temp: 96.2 °F (35.7 °C)   TempSrc: Oral   SpO2: 96%   Weight: 156 lb 6.4 oz (70.9 kg)   Height: 5' 6\" (1.676 m)     S1 and S2 normal, no murmurs, clicks, gallops or rubs. Regular rate and rhythm. Chest is clear; no wheezes or rales. No edema or JVD.  abd mass felt ( cyst)  Unchanged by exam  Neuro intact    No edema  1. Medicare annual wellness visit, subsequent  Needs prevnar  Had shingrix? - pneumococcal 13 jerardo conj dip (PREVNAR 13, PF,) 0.5 mL syrg injection; 0.5 mL by IntraMUSCular route once for 1 dose. Dispense: 0.5 mL; Refill: 0    2. Screening for alcoholism  1 per week  - [place] ANNUAL ALCOHOL SCREEN 15 MIN    3. Pure hypercholesterolemia  On statin  - CBC WITH AUTOMATED DIFF  - LIPID PANEL  - METABOLIC PANEL, COMPREHENSIVE    4. 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 Anxiety and depression  On ssri will continue    7. High risk medication use  Reviewed off asa    8. BPH with urinary obstruction  On rapaflo and doing well with that    . Advance Care Planning (ACP) Provider Conversation Snapshot    Date of ACP Conversation: 10/29/18  Persons included in Conversation:  patient  Length of ACP Conversation in minutes:  <16 minutes (Non-Billable)    Authorized Decision Maker (if patient is incapable of making informed decisions):    This person is:   Healthcare Agent/Medical Power of  under Advance Directive          For Patients with Decision Making Capacity:   Values/Goals: Exploration of values, goals, and preferences if recovery is not expected, even with continued medical treatment in the event of:  Imminent death    Conversation Outcomes / Follow-Up Plan:   Recommended completion of Advance Directive form after review of ACP materials and conversation with prospective healthcare agent     Has done in past    Stop aspirin due to age  Reviewed this

## 2018-10-30 LAB
ALBUMIN SERPL-MCNC: 4.5 G/DL (ref 3.5–4.7)
ALBUMIN/GLOB SERPL: 2 {RATIO} (ref 1.2–2.2)
ALP SERPL-CCNC: 49 IU/L (ref 39–117)
ALT SERPL-CCNC: 21 IU/L (ref 0–44)
AST SERPL-CCNC: 28 IU/L (ref 0–40)
BASOPHILS # BLD AUTO: 0 X10E3/UL (ref 0–0.2)
BASOPHILS NFR BLD AUTO: 0 %
BILIRUB SERPL-MCNC: 0.6 MG/DL (ref 0–1.2)
BUN SERPL-MCNC: 18 MG/DL (ref 8–27)
BUN/CREAT SERPL: 24 (ref 10–24)
CALCIUM SERPL-MCNC: 9.1 MG/DL (ref 8.6–10.2)
CHLORIDE SERPL-SCNC: 103 MMOL/L (ref 96–106)
CHOLEST SERPL-MCNC: 147 MG/DL (ref 100–199)
CO2 SERPL-SCNC: 25 MMOL/L (ref 20–29)
CREAT SERPL-MCNC: 0.76 MG/DL (ref 0.76–1.27)
EOSINOPHIL # BLD AUTO: 0.1 X10E3/UL (ref 0–0.4)
EOSINOPHIL NFR BLD AUTO: 1 %
ERYTHROCYTE [DISTWIDTH] IN BLOOD BY AUTOMATED COUNT: 16.3 % (ref 12.3–15.4)
GLOBULIN SER CALC-MCNC: 2.2 G/DL (ref 1.5–4.5)
GLUCOSE SERPL-MCNC: 92 MG/DL (ref 65–99)
HCT VFR BLD AUTO: 36.1 % (ref 37.5–51)
HDLC SERPL-MCNC: 57 MG/DL
HGB BLD-MCNC: 12.4 G/DL (ref 13–17.7)
IMM GRANULOCYTES # BLD: 0 X10E3/UL (ref 0–0.1)
IMM GRANULOCYTES NFR BLD: 0 %
INTERPRETATION, 910389: NORMAL
LDLC SERPL CALC-MCNC: 76 MG/DL (ref 0–99)
LYMPHOCYTES # BLD AUTO: 2.1 X10E3/UL (ref 0.7–3.1)
LYMPHOCYTES NFR BLD AUTO: 47 %
MCH RBC QN AUTO: 33.6 PG (ref 26.6–33)
MCHC RBC AUTO-ENTMCNC: 34.3 G/DL (ref 31.5–35.7)
MCV RBC AUTO: 98 FL (ref 79–97)
MONOCYTES # BLD AUTO: 0.6 X10E3/UL (ref 0.1–0.9)
MONOCYTES NFR BLD AUTO: 14 %
NEUTROPHILS # BLD AUTO: 1.7 X10E3/UL (ref 1.4–7)
NEUTROPHILS NFR BLD AUTO: 38 %
PLATELET # BLD AUTO: 204 X10E3/UL (ref 150–379)
POTASSIUM SERPL-SCNC: 4.5 MMOL/L (ref 3.5–5.2)
PROT SERPL-MCNC: 6.7 G/DL (ref 6–8.5)
RBC # BLD AUTO: 3.69 X10E6/UL (ref 4.14–5.8)
SODIUM SERPL-SCNC: 142 MMOL/L (ref 134–144)
TRIGL SERPL-MCNC: 70 MG/DL (ref 0–149)
VLDLC SERPL CALC-MCNC: 14 MG/DL (ref 5–40)
WBC # BLD AUTO: 4.5 X10E3/UL (ref 3.4–10.8)

## 2019-01-21 ENCOUNTER — OFFICE VISIT (OUTPATIENT)
Dept: INTERNAL MEDICINE CLINIC | Age: 84
End: 2019-01-21

## 2019-01-21 ENCOUNTER — HOSPITAL ENCOUNTER (OUTPATIENT)
Dept: GENERAL RADIOLOGY | Age: 84
Discharge: HOME | End: 2019-01-21
Attending: INTERNAL MEDICINE
Payer: MEDICARE

## 2019-01-21 VITALS
DIASTOLIC BLOOD PRESSURE: 63 MMHG | SYSTOLIC BLOOD PRESSURE: 125 MMHG | HEIGHT: 66 IN | OXYGEN SATURATION: 98 % | RESPIRATION RATE: 16 BRPM | TEMPERATURE: 96.8 F | BODY MASS INDEX: 24.88 KG/M2 | WEIGHT: 154.8 LBS | HEART RATE: 67 BPM

## 2019-01-21 DIAGNOSIS — S43.402A SPRAIN OF LEFT SHOULDER, UNSPECIFIED SHOULDER SPRAIN TYPE, INITIAL ENCOUNTER: Primary | ICD-10-CM

## 2019-01-21 DIAGNOSIS — S43.402A SPRAIN OF LEFT SHOULDER, UNSPECIFIED SHOULDER SPRAIN TYPE, INITIAL ENCOUNTER: ICD-10-CM

## 2019-01-21 PROCEDURE — 73030 X-RAY EXAM OF SHOULDER: CPT

## 2019-01-21 NOTE — PROGRESS NOTES
SUBJECTIVE:  Eileen Arcos is a 80 y.o. male who sustained a left shoulder injury 8 day(s) ago. Mechanism of injury: fell on ice. Immediate symptoms: delayed pain, inability to bear weight directly after injury. Symptoms have been acute since that time. Prior history of related problems: no prior problems with this area in the past.    OBJECTIVE:  Vital signs as noted above. Appearance: alert, well appearing, and in no distress. Shoulder exam: reduced range of motion of left shoulder, ecchymosis of left to elbow. [id number]  X-ray: not available. ASSESSMENT:  Shoulder sprain and contusion    PLAN:  rest the injured area as much as practical, apply ice packs  See orders for this visit as documented in the electronic medical record. Diagnoses and all orders for this visit:    1.  Sprain of left shoulder, unspecified shoulder sprain type, initial encounter  -     XR SHOULDER LT AP/LAT MIN 2 V; Future      Ice rest  PT and or ortho

## 2019-01-21 NOTE — PROGRESS NOTES
Identified pt with two pt identifiers(name and ). Reviewed record in preparation for visit and have obtained necessary documentation. Chief Complaint Patient presents with  Pain (Chronic) Left arm pain 7/10 from fall about 8 days ago Visit Vitals /63 (BP 1 Location: Right arm, BP Patient Position: Sitting) Pulse 67 Temp 96.8 °F (36 °C) (Oral) Resp 16 Ht 5' 6\" (1.676 m) Wt 154 lb 12.8 oz (70.2 kg) SpO2 98% BMI 24.99 kg/m² Health Maintenance Due Topic  DTaP/Tdap/Td series (1 - Tdap)  Shingrix Vaccine Age 50> (1 of 2)  COLONOSCOPY  GLAUCOMA SCREENING Q2Y  Influenza Age 5 to Adult Coordination of Care Questionnaire: 
:  
1) Have you been to an emergency room, urgent care, or hospitalized since your last visit?   no If yes, where when, and reason for visit? 2. Have seen or consulted any other health care provider since your last visit? NO If yes, where when, and reason for visit? 3) Do you have an Advanced Directive/ Living Will in place? YES If yes, do we have a copy on file NO If no, would you like information NO Patient is accompanied by self I have received verbal consent from [address] 55 to discuss any/all medical information while they are present in the room.

## 2019-02-06 ENCOUNTER — OFFICE VISIT (OUTPATIENT)
Dept: INTERNAL MEDICINE CLINIC | Age: 84
End: 2019-02-06

## 2019-02-06 ENCOUNTER — OFFICE VISIT (OUTPATIENT)
Dept: FAMILY MEDICINE CLINIC | Age: 84
End: 2019-02-06

## 2019-02-06 VITALS
RESPIRATION RATE: 16 BRPM | BODY MASS INDEX: 24.36 KG/M2 | WEIGHT: 151.6 LBS | SYSTOLIC BLOOD PRESSURE: 123 MMHG | TEMPERATURE: 96.3 F | DIASTOLIC BLOOD PRESSURE: 57 MMHG | HEART RATE: 71 BPM | OXYGEN SATURATION: 96 % | HEIGHT: 66 IN

## 2019-02-06 VITALS
HEART RATE: 94 BPM | HEIGHT: 66 IN | DIASTOLIC BLOOD PRESSURE: 59 MMHG | TEMPERATURE: 96 F | RESPIRATION RATE: 18 BRPM | OXYGEN SATURATION: 96 % | WEIGHT: 151.6 LBS | SYSTOLIC BLOOD PRESSURE: 107 MMHG | BODY MASS INDEX: 24.36 KG/M2

## 2019-02-06 DIAGNOSIS — R11.2 NAUSEA AND VOMITING, INTRACTABILITY OF VOMITING NOT SPECIFIED, UNSPECIFIED VOMITING TYPE: ICD-10-CM

## 2019-02-06 DIAGNOSIS — K52.9 ACUTE GASTROENTERITIS: Primary | ICD-10-CM

## 2019-02-06 DIAGNOSIS — K52.9 GASTROENTERITIS: Primary | ICD-10-CM

## 2019-02-06 RX ORDER — ONDANSETRON 4 MG/1
4 TABLET, FILM COATED ORAL
Qty: 12 TAB | Refills: 0 | Status: SHIPPED | OUTPATIENT
Start: 2019-02-06 | End: 2019-05-16 | Stop reason: ALTCHOICE

## 2019-02-06 NOTE — PROGRESS NOTES
Chief Complaint Patient presents with  Nausea Patient with nausea and diarrhea this am, vomiting, lots of liquid, brown in color and chunks like feces in vomit at 3 am x 3. Nausea this am but no vomiting Patient given Zofran 4 mg dissovable tablet per v.o. Of Emeka Bedolla, NP with a sip of water.

## 2019-02-06 NOTE — PROGRESS NOTES
1. Have you been to the ER, urgent care clinic since your last visit? Hospitalized since your last visit?[facility] this morning-vomit    2. Have you seen or consulted any other health care providers outside of the [address] [place] [address] since your last visit? Include any pap smears or colon screening.  No

## 2019-02-06 NOTE — PROGRESS NOTES
Patient : This patient is a 80 y.o. male with chief complaint of nausea and vomiting. The symptoms began several hours ago and have stayed. The symptoms were rapid in onset. The patient reports several episodes of vomiting. He last vomiting in parking lot prior to coming in to the office. The emesis was  undigested food \"chunky\", it is now  yellow. The patient has not complaint of abdominal pain. He took baking soda last night without relief. Pt's wife complained last night of nausea and had diarrhea but no vomiting. He denies any fevers. He has not had anything to eat or drink today. evaluation. Vitals:    02/06/19 1602   BP: 107/59   Pulse: 94   Resp: 18   Temp: 96 °F (35.6 °C)   TempSrc: Oral   SpO2: 96%   Weight: 151 lb 9.6 oz (68.8 kg)   Height: 5' 6\" (1.676 m)     BP Readings from Last 3 Encounters:   02/06/19 107/59   02/06/19 123/57   01/21/19 125/63     Temp Readings from Last 3 Encounters:   02/06/19 96 °F (35.6 °C) (Oral)   02/06/19 96.3 °F (35.7 °C) (Oral)   01/21/19 96.8 °F (36 °C) (Oral)        no apparent distress  The abdomen is soft without tenderness, guarding, mass, rebound or organomegaly. Bowel sounds are normal. No CVA tenderness or inguinal adenopathy noted.       1. Gastroenteritis  Has zofran at home   Light diet advance slowly

## 2019-02-06 NOTE — PROGRESS NOTES
Subjective:  
  
Patient : This patient is a 80 y.o. male with chief complaint of nausea and vomiting. The symptoms began several hours ago and have stayed. The symptoms were rapid in onset. The patient reports several episodes of vomiting. He last vomiting in parking lot prior to coming in to the office. The emesis was  undigested food \"chunky\", it is now  yellow. The patient has not complaint of abdominal pain. He took baking soda last night without relief. Pt's wife complained last night of nausea and had diarrhea but no vomiting. He denies any fevers. He has not had anything to eat or drink today. Has appt with PCP at 4pm today (which he plans on keeping) but wanted to come here sooner for evaluation. Past Medical History:  
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  
Procedure Laterality Date  ENDOSCOPY, COLON, DIAGNOSTIC    
 12/09; [facility]  HX COLONOSCOPY  2008  HX ORTHOPAEDIC    
 at 6years old broke arm  HX TONSIL AND ADENOIDECTOMY    
 about 9years old Allergies Allergen Reactions  Sulfa (Sulfonamide Antibiotics) Unknown (comments) Objective:  
 
ROS:   
Feeling well. No dyspnea or chest pain on exertion. No abdominal pain, change in bowel habits, black or bloody stools. No urinary tract or prostatic symptoms. No neurological complaints. OBJECTIVE: The patient appears well, alert, oriented x 3, in no distress. Visit Vitals /57 Pulse 71 Temp 96.3 °F (35.7 °C) (Oral) Resp 16 Ht 5' 6\" (1.676 m) Wt 151 lb 9.6 oz (68.8 kg) SpO2 96% BMI 24.47 kg/m² ENT normal.  Neck supple. No adenopathy or thyromegaly. KATHLEEN. Lungs are clear, good air entry, no wheezes, rhonchi or rales. Cardiovascular:S1 and S2 normal, no murmurs, regular rate and rhythm. Abdomen is soft without tenderness, guarding, mass or organomegaly. Extremities show no edema, normal peripheral pulses. Neurological is normal without focal findings. Assessment/Plan: ICD-10-CM ICD-9-CM 1. Acute gastroenteritis K52.9 558.9 2. Nausea and vomiting, intractability of vomiting not specified, unspecified vomiting type R11.2 787.01 Orders Placed This Encounter  ondansetron hcl (ZOFRAN) 4 mg tablet Zofran 4mg odt given here in clinic and pt tolerated small cup of water. Encouraged small sips of fluids, then crackers, then advance to bland diet. Precautions given. He plans to see PCP later this afternoon. RTC prn. Velia Herring NP This note will not be viewable in [address]

## 2019-02-06 NOTE — PATIENT INSTRUCTIONS
Gastroenteritis: Care Instructions Your Care Instructions Gastroenteritis is an illness that may cause nausea, vomiting, and diarrhea. It is sometimes called \"stomach flu. \" It can be caused by bacteria or a virus. You will probably begin to feel better in 1 to 2 days. In the meantime, get plenty of rest and make sure you do not become dehydrated. Dehydration occurs when your body loses too much fluid. Follow-up care is a key part of your treatment and safety. Be sure to make and go to all appointments, and call your doctor if you are having problems. It's also a good idea to know your test results and keep a list of the medicines you take. How can you care for yourself at home? · If your doctor prescribed antibiotics, take them as directed. Do not stop taking them just because you feel better. You need to take the full course of antibiotics. · Drink plenty of fluids to prevent dehydration, enough so that your urine is light yellow or clear like water. Choose water and other caffeine-free clear liquids until you feel better. If you have kidney, heart, or liver disease and have to limit fluids, talk with your doctor before you increase your fluid intake. · Drink fluids slowly, in frequent, small amounts, because drinking too much too fast can cause vomiting. · Begin eating mild foods, such as dry toast, yogurt, applesauce, bananas, and rice. Avoid spicy, hot, or high-fat foods, and do not drink alcohol or caffeine for a day or two. Do not drink milk or eat ice cream until you are feeling better. How to prevent gastroenteritis · Keep hot foods hot and cold foods cold. · Do not eat meats, dressings, salads, or other foods that have been kept at room temperature for more than 2 hours. · Use a thermometer to check your refrigerator. It should be between 34°F and 40°F. 
· Defrost meats in the refrigerator or microwave, not on the kitchen counter. · Keep your hands and your kitchen clean. Wash your hands, cutting boards, and countertops with hot soapy water frequently. · Cook meat until it is well done. · Do not eat raw eggs or uncooked sauces made with raw eggs. · Do not take chances. If food looks or tastes spoiled, throw it out. When should you call for help? Call 911 anytime you think you may need emergency care. For example, call if: 
  · You vomit blood or what looks like coffee grounds.  
  · You passed out (lost consciousness).  
  · You pass maroon or very bloody stools.  
 Call your doctor now or seek immediate medical care if: 
  · You have severe belly pain.  
  · You have signs of needing more fluids. You have sunken eyes, a dry mouth, and pass only a little dark urine.  
  · You feel like you are going to faint.  
  · You have increased belly pain that does not go away in 1 to 2 days.  
  · You have new or increased nausea, or you are vomiting.  
  · You have a new or higher fever.  
  · Your stools are black and tarlike or have streaks of blood.  
 Watch closely for changes in your health, and be sure to contact your doctor if: 
  · You are dizzy or lightheaded.  
  · You urinate less than usual, or your urine is dark yellow or brown.  
  · You do not feel better with each day that goes by. Where can you learn more? Go to http://sherwin-caprice.info/. Enter N142 in the search box to learn more about \"Gastroenteritis: Care Instructions. \" Current as of: July 30, 2018 Content Version: 11.9 © 8388-7009 [employer]. Care instructions adapted under license by [employer] (which disclaims liability or warranty for this information). If you have questions about a medical condition or this instruction, always ask your healthcare professional. [address] any warranty or liability for your use of this information.

## 2019-02-07 DIAGNOSIS — E78.00 PURE HYPERCHOLESTEROLEMIA: ICD-10-CM

## 2019-02-07 RX ORDER — SIMVASTATIN 10 MG/1
TABLET, FILM COATED ORAL
Qty: 90 TAB | Refills: 1 | Status: SHIPPED | OUTPATIENT
Start: 2019-02-07 | End: 2019-08-02 | Stop reason: SDUPTHER

## 2019-05-05 DIAGNOSIS — F32.A ANXIETY AND DEPRESSION: ICD-10-CM

## 2019-05-05 DIAGNOSIS — F41.9 ANXIETY AND DEPRESSION: ICD-10-CM

## 2019-05-05 RX ORDER — ESCITALOPRAM OXALATE 10 MG/1
TABLET ORAL
Qty: 90 TAB | Refills: 1 | Status: SHIPPED | OUTPATIENT
Start: 2019-05-05 | End: 2019-12-17 | Stop reason: SDUPTHER

## 2019-05-16 ENCOUNTER — OFFICE VISIT (OUTPATIENT)
Dept: INTERNAL MEDICINE CLINIC | Age: 84
End: 2019-05-16

## 2019-05-16 VITALS
WEIGHT: 149 LBS | BODY MASS INDEX: 23.95 KG/M2 | OXYGEN SATURATION: 97 % | HEIGHT: 66 IN | TEMPERATURE: 97.6 F | RESPIRATION RATE: 18 BRPM | SYSTOLIC BLOOD PRESSURE: 123 MMHG | DIASTOLIC BLOOD PRESSURE: 52 MMHG

## 2019-05-16 DIAGNOSIS — S20.219D CONTUSION OF RIB, UNSPECIFIED LATERALITY, SUBSEQUENT ENCOUNTER: Primary | ICD-10-CM

## 2019-05-16 DIAGNOSIS — J06.9 UPPER RESPIRATORY TRACT INFECTION, UNSPECIFIED TYPE: ICD-10-CM

## 2019-05-16 NOTE — PROGRESS NOTES
1. Have you been to the ER, urgent care clinic since your last visit? Hospitalized since your last visit?yes. Pt first on 5/9/19 for cracked rib    2. Have you seen or consulted any other health care providers outside of the [address] [place] [address] since your last visit? Include any pap smears or colon screening.  Urology, dr esquivel-rheumatology

## 2019-05-16 NOTE — PROGRESS NOTES
Problem follow up:  Rain Reyes returns for follow up visit regarding fall 8 days ago. he was seen 8  days ago in pt first diagnosed with no fractures and treated with tylenol. Workup was significant for neg xray. Notes, labs, studies, imaging related to this problem during prior visit were available . Since that visit, he has improved. he has been compliant with prescribed treatment. Residual symptoms include: pain ant chest  New issues associated with this problem include: URI  For 4 days with cough. Some allergy issues not using flonase      Vitals:    05/16/19 0855   BP: 123/52   Resp: 18   Temp: 97.6 °F (36.4 °C)   TempSrc: Oral   SpO2: 97%   Weight: 149 lb (67.6 kg)   Height: 5' 6\" (1.676 m)   no apparent distress  Throat normal  drainage  S1 and S2 normal, no murmurs, clicks, gallops or rubs. Regular rate and rhythm. Chest is clear; no wheezes or rales. No edema or JVD. Slight chest wall tenderness    Diagnoses and all orders for this visit:    1. Contusion of rib, unspecified laterality, subsequent encounter    2.  Upper respiratory tract infection, unspecified type      suspect viral or allergic  Voice rest  Nasal congestion and allergies I advise using Flonase OTC as a symptomatic reliever

## 2019-08-02 DIAGNOSIS — E78.00 PURE HYPERCHOLESTEROLEMIA: ICD-10-CM

## 2019-08-02 RX ORDER — SIMVASTATIN 10 MG/1
TABLET, FILM COATED ORAL
Qty: 90 TAB | Refills: 4 | Status: SHIPPED | OUTPATIENT
Start: 2019-08-02

## 2019-12-17 DIAGNOSIS — F32.A ANXIETY AND DEPRESSION: ICD-10-CM

## 2019-12-17 DIAGNOSIS — F41.9 ANXIETY AND DEPRESSION: ICD-10-CM

## 2019-12-17 RX ORDER — ESCITALOPRAM OXALATE 10 MG/1
TABLET ORAL
Qty: 90 TAB | Refills: 1 | Status: CANCELLED | OUTPATIENT
Start: 2019-12-17

## 2019-12-17 RX ORDER — ESCITALOPRAM OXALATE 10 MG/1
TABLET ORAL
Qty: 90 TAB | Refills: 0 | Status: SHIPPED | OUTPATIENT
Start: 2019-12-17